# Patient Record
Sex: MALE | Race: WHITE | Employment: FULL TIME | URBAN - METROPOLITAN AREA
[De-identification: names, ages, dates, MRNs, and addresses within clinical notes are randomized per-mention and may not be internally consistent; named-entity substitution may affect disease eponyms.]

---

## 2021-05-20 ENCOUNTER — OFFICE VISIT (OUTPATIENT)
Dept: CARDIOLOGY CLINIC | Age: 66
End: 2021-05-20
Payer: MEDICARE

## 2021-05-20 ENCOUNTER — HOSPITAL ENCOUNTER (OUTPATIENT)
Dept: LAB | Age: 66
Discharge: HOME | End: 2021-05-20
Payer: MEDICARE

## 2021-05-20 VITALS
BODY MASS INDEX: 24.5 KG/M2 | OXYGEN SATURATION: 99 % | WEIGHT: 175 LBS | HEIGHT: 71 IN | HEART RATE: 47 BPM | SYSTOLIC BLOOD PRESSURE: 144 MMHG | DIASTOLIC BLOOD PRESSURE: 86 MMHG

## 2021-05-20 DIAGNOSIS — R00.2 PALPITATIONS: ICD-10-CM

## 2021-05-20 DIAGNOSIS — R06.02 SOB (SHORTNESS OF BREATH): ICD-10-CM

## 2021-05-20 DIAGNOSIS — R00.1 BRADYCARDIA: Primary | ICD-10-CM

## 2021-05-20 DIAGNOSIS — R00.1 BRADYCARDIA: ICD-10-CM

## 2021-05-20 DIAGNOSIS — I49.9 IRREGULAR HEART BEAT: ICD-10-CM

## 2021-05-20 DIAGNOSIS — R53.83 FATIGUE, UNSPECIFIED TYPE: ICD-10-CM

## 2021-05-20 DIAGNOSIS — Z86.79 HX OF VIRAL PERICARDITIS: ICD-10-CM

## 2021-05-20 DIAGNOSIS — E21.5 PARATHYROID DISORDER (HCC): ICD-10-CM

## 2021-05-20 LAB
ALBUMIN SERPL-MCNC: 3.8 G/DL (ref 3.4–5)
ALBUMIN/GLOB SERPL: 1.3 {RATIO} (ref 0.8–1.7)
ALP SERPL-CCNC: 41 U/L (ref 45–117)
ALT SERPL-CCNC: 23 U/L (ref 16–61)
ANION GAP SERPL CALC-SCNC: 5 MMOL/L (ref 3–18)
AST SERPL-CCNC: 18 U/L (ref 10–38)
BASOPHILS # BLD: 0 K/UL (ref 0–0.1)
BASOPHILS NFR BLD: 1 % (ref 0–2)
BILIRUB SERPL-MCNC: 0.8 MG/DL (ref 0.2–1)
BUN SERPL-MCNC: 16 MG/DL (ref 7–18)
BUN/CREAT SERPL: 23 (ref 12–20)
CALCIUM SERPL-MCNC: 8.5 MG/DL (ref 8.5–10.1)
CHLORIDE SERPL-SCNC: 110 MMOL/L (ref 100–111)
CO2 SERPL-SCNC: 29 MMOL/L (ref 21–32)
CREAT SERPL-MCNC: 0.71 MG/DL (ref 0.6–1.3)
DIFFERENTIAL METHOD BLD: ABNORMAL
EOSINOPHIL # BLD: 0.1 K/UL (ref 0–0.4)
EOSINOPHIL NFR BLD: 3 % (ref 0–5)
ERYTHROCYTE [DISTWIDTH] IN BLOOD BY AUTOMATED COUNT: 12.4 % (ref 11.6–14.5)
GLOBULIN SER CALC-MCNC: 3 G/DL (ref 2–4)
GLUCOSE SERPL-MCNC: 88 MG/DL (ref 74–99)
HCT VFR BLD AUTO: 40.1 % (ref 36–48)
HGB BLD-MCNC: 13.2 G/DL (ref 13–16)
LYMPHOCYTES # BLD: 1.4 K/UL (ref 0.9–3.6)
LYMPHOCYTES NFR BLD: 28 % (ref 21–52)
MAGNESIUM SERPL-MCNC: 2.1 MG/DL (ref 1.6–2.6)
MCH RBC QN AUTO: 29.7 PG (ref 24–34)
MCHC RBC AUTO-ENTMCNC: 32.9 G/DL (ref 31–37)
MCV RBC AUTO: 90.1 FL (ref 74–97)
MONOCYTES # BLD: 0.6 K/UL (ref 0.05–1.2)
MONOCYTES NFR BLD: 11 % (ref 3–10)
NEUTS SEG # BLD: 2.9 K/UL (ref 1.8–8)
NEUTS SEG NFR BLD: 57 % (ref 40–73)
PLATELET # BLD AUTO: 173 K/UL (ref 135–420)
PMV BLD AUTO: 12.3 FL (ref 9.2–11.8)
POTASSIUM SERPL-SCNC: 4.4 MMOL/L (ref 3.5–5.5)
PROT SERPL-MCNC: 6.8 G/DL (ref 6.4–8.2)
RBC # BLD AUTO: 4.45 M/UL (ref 4.35–5.65)
SODIUM SERPL-SCNC: 144 MMOL/L (ref 136–145)
T4 FREE SERPL-MCNC: 0.9 NG/DL (ref 0.7–1.5)
TSH SERPL DL<=0.05 MIU/L-ACNC: 2.81 UIU/ML (ref 0.36–3.74)
WBC # BLD AUTO: 5 K/UL (ref 4.6–13.2)

## 2021-05-20 PROCEDURE — 1101F PT FALLS ASSESS-DOCD LE1/YR: CPT | Performed by: INTERNAL MEDICINE

## 2021-05-20 PROCEDURE — 85025 COMPLETE CBC W/AUTO DIFF WBC: CPT

## 2021-05-20 PROCEDURE — G8510 SCR DEP NEG, NO PLAN REQD: HCPCS | Performed by: INTERNAL MEDICINE

## 2021-05-20 PROCEDURE — 93000 ELECTROCARDIOGRAM COMPLETE: CPT | Performed by: INTERNAL MEDICINE

## 2021-05-20 PROCEDURE — 80053 COMPREHEN METABOLIC PANEL: CPT

## 2021-05-20 PROCEDURE — 83735 ASSAY OF MAGNESIUM: CPT

## 2021-05-20 PROCEDURE — G8427 DOCREV CUR MEDS BY ELIG CLIN: HCPCS | Performed by: INTERNAL MEDICINE

## 2021-05-20 PROCEDURE — G8420 CALC BMI NORM PARAMETERS: HCPCS | Performed by: INTERNAL MEDICINE

## 2021-05-20 PROCEDURE — G8536 NO DOC ELDER MAL SCRN: HCPCS | Performed by: INTERNAL MEDICINE

## 2021-05-20 PROCEDURE — 99205 OFFICE O/P NEW HI 60 MIN: CPT | Performed by: INTERNAL MEDICINE

## 2021-05-20 PROCEDURE — 3017F COLORECTAL CA SCREEN DOC REV: CPT | Performed by: INTERNAL MEDICINE

## 2021-05-20 PROCEDURE — 36415 COLL VENOUS BLD VENIPUNCTURE: CPT

## 2021-05-20 PROCEDURE — 84439 ASSAY OF FREE THYROXINE: CPT

## 2021-05-20 RX ORDER — MELOXICAM 15 MG/1
15 TABLET ORAL DAILY
COMMUNITY
Start: 2021-03-22 | End: 2022-07-18 | Stop reason: ALTCHOICE

## 2021-05-20 RX ORDER — SERTRALINE HYDROCHLORIDE 100 MG/1
100 TABLET, FILM COATED ORAL DAILY
COMMUNITY
Start: 2021-03-18

## 2021-05-20 RX ORDER — TAMSULOSIN HYDROCHLORIDE 0.4 MG/1
0.4 CAPSULE ORAL DAILY
COMMUNITY

## 2021-05-20 RX ORDER — LISINOPRIL 10 MG/1
10 TABLET ORAL DAILY
COMMUNITY
Start: 2021-03-18

## 2021-05-20 RX ORDER — OMEPRAZOLE 20 MG/1
20 CAPSULE, DELAYED RELEASE ORAL DAILY
COMMUNITY
Start: 2021-03-18

## 2021-05-20 NOTE — PROGRESS NOTES
Lu Negrete presents today for   Chief Complaint   Patient presents with    New Patient     Self Ref AFib / irregluar hrt beat and rate       Lu Negrete preferred language for health care discussion is english/other. Is someone accompanying this pt? yes    Is the patient using any DME equipment during [address]? no    Depression Screening:  3 most recent PHQ Screens 5/20/2021   Little interest or pleasure in doing things Not at all   Feeling down, depressed, irritable, or hopeless Not at all   Total Score PHQ 2 0       Learning Assessment:  Learning Assessment 5/20/2021   PRIMARY LEARNER Patient   PRIMARY LANGUAGE ENGLISH   LEARNER PREFERENCE PRIMARY DEMONSTRATION   ANSWERED BY patient   RELATIONSHIP SELF       Abuse Screening:  Abuse Screening Questionnaire 5/20/2021   Do you ever feel afraid of your partner? N   Are you in a relationship with someone who physically or mentally threatens you? N   Is it safe for you to go home? Y       Fall Risk  Fall Risk Assessment, last 12 mths 5/20/2021   Able to walk? Yes   Fall in past 12 months? 0   Do you feel unsteady? 0   Are you worried about falling 0           Pt currently taking Anticoagulant therapy? no    Pt currently taking Antiplatelet therapy ? no      Coordination of Care:  1. Have you been to the ER, urgent care clinic since your last visit? Hospitalized since your last visit? no    2. Have you seen or consulted any other health care providers outside of the [address] [place] [address] since your last visit? Include any pap smears or colon screening.  no

## 2021-05-20 NOTE — PROGRESS NOTES
History of Present Illness:  77year old male, self referred for low heart rate and irregular heartbeat. He had a physical a couple months ago and was told everything was okay. 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 He went inside to check his blood pressure and systolic was 110Z, but heart rate was about 45-50 bpm.  His wife checked his heart rate as well. He was noted to have some irregularity. Notes minimal palpitations. No significant dyspnea or chest pain, PND, orthopnea or edema. He had remote pericarditis many years ago, thought to be potentially due to either a virus or chemical exposure. He has never had any other history of heart disease. No diabetes. He does have a history of high blood pressure. No dyslipidemia. No tobacco or alcohol use. He is quite active on the farm. Impression:  1. Recent findings of bradycardia, heart rate high 40s, with possible associated symptoms of fatigue, no syncope. 2. History of hypertension, on ACE inhibitor. 3. GERD, on PPI. 4. History of parathyroidectomy many years ago. 5. History of pericarditis in remote past, unclear etiology. 6. Recent increase in stressors with some change in farm equipment. 7. Planned trip to [facility], [facility] over [employer]. Plan:  I had a lengthy discussion. At this point I do not have a good etiology, but clearly he is at risk for symptomatic worsening bradycardia given his baseline. He is, however, quite active and otherwise healthy on the farm, so it is unclear if this is just his natural baseline. I am going to obtain an event monitor for a few weeks, as well as complete blood work, including calcium and TSH. I am also going to obtain an echocardiogram given his previous pericarditis. I will see him back after testing. All questions answered.       Past Medical History:   Diagnosis Date    Anxiety     Arthritis     Enlarged prostate     Essential hypertension        Current Outpatient Medications   Medication Sig Dispense Refill    sertraline (ZOLOFT) 100 mg tablet Take 100 mg by mouth daily.  tamsulosin (FLOMAX) 0.4 mg capsule Take 0.4 mg by mouth daily.  omeprazole (PRILOSEC) 20 mg capsule Take 20 mg by mouth daily.  lisinopriL (PRINIVIL, ZESTRIL) 10 mg tablet Take 10 mg by mouth daily.  meloxicam (MOBIC) 15 mg tablet Take 15 mg by mouth daily. Social History   reports that he has quit smoking. He has quit using smokeless tobacco.   reports current alcohol use of about 1.0 standard drinks of alcohol per week. Family History  family history includes Diabetes in his father; Heart Surgery in his father. Review of Systems  Except as stated above include:  Constitutional: Negative for fever, chills and malaise/fatigue. HEENT: No congestion or recent URI. Gastrointestinal: No nausea, vomiting, abdominal pain, bloody stools. Pulmonary:  Negative except as stated above. Cardiac:  Negative except as stated above. Musculoskeletal: Negative except as stated above. Neurological:  No localized symptoms. Skin:  Negative except as stated above. Psych:  Negative except as stated above. Endocrine:  Negative except as stated above. PHYSICAL EXAM  BP Readings from Last 3 Encounters:   05/20/21 (!) 144/86     Pulse Readings from Last 3 Encounters:   05/20/21 (!) 47     Wt Readings from Last 3 Encounters:   05/20/21 79.4 kg (175 lb)     General:   Well developed, well groomed. Head/Neck:   No obvious jugular venous distention     No obvious carotid pulsations. No evidence of xanthelasma. Lungs:   No respiratory distress. Clear bilaterally. Heart:  Debora Juana, regular. Normal S1/S2. Palpation grossly normal.    No significant murmurs, rubs or gallops. Abdomen:   Non-acute abdomen. No obvious pulsations. Extremities:   Intact peripheral pulses. No significant edema. Neurological:   Alert and oriented to person, place, time. No focal neurological deficit visually. Skin:   No obvious rash    Blood Pressure Metric:  Monitor recommended and adjustments stated if needed.

## 2021-06-24 LAB
ECHO AO ROOT DIAM: 3.5 CM
ECHO LA AREA 4C: 18.81 CM2
ECHO LA VOL 2C: 77.99 ML (ref 18–58)
ECHO LA VOL 4C: 50.92 ML (ref 18–58)
ECHO LA VOL BP: 72.33 ML (ref 18–58)
ECHO LA VOL/BSA BIPLANE: 36.35 ML/M2 (ref 16–28)
ECHO LA VOLUME INDEX A2C: 39.19 ML/M2 (ref 16–28)
ECHO LA VOLUME INDEX A4C: 25.59 ML/M2 (ref 16–28)
ECHO LV E' LATERAL VELOCITY: 8.18 CM/S
ECHO LV E' SEPTAL VELOCITY: 9.47 CM/S
ECHO LV INTERNAL DIMENSION DIASTOLIC: 4.84 CM (ref 4.2–5.9)
ECHO LV INTERNAL DIMENSION SYSTOLIC: 3.47 CM
ECHO LV IVSD: 1.01 CM (ref 0.6–1)
ECHO LV MASS 2D: 167.9 G (ref 88–224)
ECHO LV MASS INDEX 2D: 84.4 G/M2 (ref 49–115)
ECHO LV POSTERIOR WALL DIASTOLIC: 0.95 CM (ref 0.6–1)
ECHO LVOT DIAM: 2 CM
ECHO LVOT PEAK GRADIENT: 3.27 MMHG
ECHO LVOT PEAK VELOCITY: 90.39 CM/S
ECHO LVOT SV: 67.2 ML
ECHO LVOT VTI: 21.3 CM
ECHO MV A VELOCITY: 50.97 CM/S
ECHO MV E DECELERATION TIME (DT): 230.88 MS
ECHO MV E VELOCITY: 75.1 CM/S
ECHO MV E/A RATIO: 1.47
ECHO MV E/E' LATERAL: 9.18
ECHO MV E/E' RATIO (AVERAGED): 8.56
ECHO MV E/E' SEPTAL: 7.93
ECHO PVEIN A DURATION: 108.47 MS
ECHO PVEIN A VELOCITY: 18.94 CM/S
LVOT MG: 1.92 MMHG

## 2021-06-30 ENCOUNTER — OFFICE VISIT (OUTPATIENT)
Dept: CARDIOLOGY CLINIC | Age: 66
End: 2021-06-30
Payer: MEDICARE

## 2021-06-30 VITALS
HEART RATE: 64 BPM | OXYGEN SATURATION: 98 % | SYSTOLIC BLOOD PRESSURE: 114 MMHG | HEIGHT: 71 IN | BODY MASS INDEX: 24.08 KG/M2 | DIASTOLIC BLOOD PRESSURE: 60 MMHG | WEIGHT: 172 LBS

## 2021-06-30 DIAGNOSIS — R00.1 BRADYCARDIA: ICD-10-CM

## 2021-06-30 DIAGNOSIS — I49.5 SICK SINUS SYNDROME (HCC): Primary | ICD-10-CM

## 2021-06-30 DIAGNOSIS — R00.2 PALPITATIONS: ICD-10-CM

## 2021-06-30 DIAGNOSIS — R53.83 FATIGUE, UNSPECIFIED TYPE: ICD-10-CM

## 2021-06-30 DIAGNOSIS — R06.02 SOB (SHORTNESS OF BREATH): ICD-10-CM

## 2021-06-30 DIAGNOSIS — I49.9 IRREGULAR HEART BEAT: ICD-10-CM

## 2021-06-30 PROCEDURE — 3017F COLORECTAL CA SCREEN DOC REV: CPT | Performed by: INTERNAL MEDICINE

## 2021-06-30 PROCEDURE — G8536 NO DOC ELDER MAL SCRN: HCPCS | Performed by: INTERNAL MEDICINE

## 2021-06-30 PROCEDURE — G8510 SCR DEP NEG, NO PLAN REQD: HCPCS | Performed by: INTERNAL MEDICINE

## 2021-06-30 PROCEDURE — 1101F PT FALLS ASSESS-DOCD LE1/YR: CPT | Performed by: INTERNAL MEDICINE

## 2021-06-30 PROCEDURE — G8427 DOCREV CUR MEDS BY ELIG CLIN: HCPCS | Performed by: INTERNAL MEDICINE

## 2021-06-30 PROCEDURE — 99214 OFFICE O/P EST MOD 30 MIN: CPT | Performed by: INTERNAL MEDICINE

## 2021-06-30 PROCEDURE — G8420 CALC BMI NORM PARAMETERS: HCPCS | Performed by: INTERNAL MEDICINE

## 2021-06-30 NOTE — PROGRESS NOTES
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 He did notice that his energy was decreased and he had some rare palpitations. No chest pain, syncope. He states he has a hard time getting going in the morning, but then once he gets going he actually feels well. He had an echocardiogram, event monitor and blood work. Here to discuss options, accompanied by family. Impression:  1. Recent findings of bradycardia with heart rate down to 40s, although event monitor showed heart rate between 40s up to 120s with slightly blunted response consistent with mild sick sinus syndrome, but no prolonged pauses. 2. History of hypertension, on ACE inhibitor. 3. Echo June, 2021 with normal function, mild left atrial enlargement. 4. GERD, on PPI. 5. History of remote parathyroidectomy. 6. History of pericarditis in the remote past.    Plan:  I had a discussion about his mild sick sinus syndrome without prolonged pauses. He seems to do better after he starts working, which usually is the opposite for significant sick sinus syndrome to cause symptoms. It may be contributing partially and I recommended we continue to monitor. I will plan to repeat an echocardiogram in 2-3 years due to some mild aortic root dilatation and mild left atrial enlargement, and I will see him back ni a year with an EKG unless symptoms change. Past Medical History:   Diagnosis Date    Anxiety     Arthritis     Enlarged prostate     Essential hypertension        Current Outpatient Medications   Medication Sig Dispense Refill    sertraline (ZOLOFT) 100 mg tablet Take 100 mg by mouth daily.  tamsulosin (FLOMAX) 0.4 mg capsule Take 0.4 mg by mouth daily.  omeprazole (PRILOSEC) 20 mg capsule Take 20 mg by mouth daily.  lisinopriL (PRINIVIL, ZESTRIL) 10 mg tablet Take 10 mg by mouth daily.  meloxicam (MOBIC) 15 mg tablet Take 15 mg by mouth daily.          Social History   reports that he has quit smoking. He has quit using smokeless tobacco.   reports current alcohol use of about 1.0 standard drinks of alcohol per week. Family History  family history includes Diabetes in his father; Heart Surgery in his father. Review of Systems  Except as stated above include:  Constitutional: Negative for fever, chills and malaise/fatigue. HEENT: No congestion or recent URI. Gastrointestinal: No nausea, vomiting, abdominal pain, bloody stools. Pulmonary:  Negative except as stated above. Cardiac:  Negative except as stated above. Musculoskeletal: Negative except as stated above. Neurological:  No localized symptoms. Skin:  Negative except as stated above. Psych:  Negative except as stated above. Endocrine:  Negative except as stated above. PHYSICAL EXAM  BP Readings from Last 3 Encounters:   06/24/21 (!) 144/86   05/20/21 (!) 144/86     Pulse Readings from Last 3 Encounters:   05/20/21 (!) 47     Wt Readings from Last 3 Encounters:   06/24/21 79.4 kg (175 lb)   05/20/21 79.4 kg (175 lb)     General:   Well developed, well groomed. Head/Neck:   No obvious jugular venous distention     No obvious carotid pulsations. No evidence of xanthelasma. Lungs:   No respiratory distress. Clear bilaterally. Heart:  Regular rate and rhythm. Normal S1/S2. Palpation grossly normal.    No significant murmurs, rubs or gallops. Abdomen:   Non-acute abdomen. No obvious pulsations. Extremities:   Intact peripheral pulses. No significant edema. Neurological:   Alert and oriented to person, place, time. No focal neurological deficit visually. Skin:   No obvious rash    Blood Pressure Metric:  Monitor recommended and adjustments stated if needed.

## 2021-06-30 NOTE — PROGRESS NOTES
Dangelo Brandt presents today for   Chief Complaint   Patient presents with    Follow-up     5 week follow up        Dangelo Brandt preferred language for health care discussion is english/other. Is someone accompanying this pt? no    Is the patient using any DME equipment during [address]? no    Depression Screening:  3 most recent PHQ Screens 6/30/2021   Little interest or pleasure in doing things Not at all   Feeling down, depressed, irritable, or hopeless Not at all   Total Score PHQ 2 0       Learning Assessment:  Learning Assessment 5/20/2021   PRIMARY LEARNER Patient   PRIMARY LANGUAGE ENGLISH   LEARNER PREFERENCE PRIMARY DEMONSTRATION   ANSWERED BY patient   RELATIONSHIP SELF       Abuse Screening:  Abuse Screening Questionnaire 5/20/2021   Do you ever feel afraid of your partner? N   Are you in a relationship with someone who physically or mentally threatens you? N   Is it safe for you to go home? Y       Fall Risk  Fall Risk Assessment, last 12 mths 6/30/2021   Able to walk? Yes   Fall in past 12 months? 0   Do you feel unsteady? 0   Are you worried about falling 0       Pt currently taking Anticoagulant therapy? no    Coordination of Care:  1. Have you been to the ER, urgent care clinic since your last visit? Hospitalized since your last visit? no    2. Have you seen or consulted any other health care providers outside of the [address] [place] [address] since your last visit? Include any pap smears or colon screening.  no

## 2022-06-29 ENCOUNTER — OFFICE VISIT (OUTPATIENT)
Dept: CARDIOLOGY CLINIC | Age: 67
End: 2022-06-29
Payer: MEDICARE

## 2022-06-29 VITALS
SYSTOLIC BLOOD PRESSURE: 118 MMHG | HEIGHT: 71 IN | DIASTOLIC BLOOD PRESSURE: 62 MMHG | BODY MASS INDEX: 24.08 KG/M2 | HEART RATE: 43 BPM | OXYGEN SATURATION: 97 % | WEIGHT: 172 LBS

## 2022-06-29 DIAGNOSIS — R06.02 SOB (SHORTNESS OF BREATH): ICD-10-CM

## 2022-06-29 DIAGNOSIS — R00.1 BRADYCARDIA: Primary | ICD-10-CM

## 2022-06-29 DIAGNOSIS — R00.2 PALPITATIONS: ICD-10-CM

## 2022-06-29 DIAGNOSIS — R53.83 FATIGUE, UNSPECIFIED TYPE: ICD-10-CM

## 2022-06-29 DIAGNOSIS — K21.9 GASTROESOPHAGEAL REFLUX DISEASE, UNSPECIFIED WHETHER ESOPHAGITIS PRESENT: ICD-10-CM

## 2022-06-29 DIAGNOSIS — I49.9 IRREGULAR HEART BEAT: ICD-10-CM

## 2022-06-29 DIAGNOSIS — I10 PRIMARY HYPERTENSION: ICD-10-CM

## 2022-06-29 DIAGNOSIS — M19.91 PRIMARY OSTEOARTHRITIS, UNSPECIFIED SITE: ICD-10-CM

## 2022-06-29 PROCEDURE — G8432 DEP SCR NOT DOC, RNG: HCPCS | Performed by: INTERNAL MEDICINE

## 2022-06-29 PROCEDURE — G8752 SYS BP LESS 140: HCPCS | Performed by: INTERNAL MEDICINE

## 2022-06-29 PROCEDURE — G8427 DOCREV CUR MEDS BY ELIG CLIN: HCPCS | Performed by: INTERNAL MEDICINE

## 2022-06-29 PROCEDURE — G8754 DIAS BP LESS 90: HCPCS | Performed by: INTERNAL MEDICINE

## 2022-06-29 PROCEDURE — G8536 NO DOC ELDER MAL SCRN: HCPCS | Performed by: INTERNAL MEDICINE

## 2022-06-29 PROCEDURE — 93000 ELECTROCARDIOGRAM COMPLETE: CPT | Performed by: INTERNAL MEDICINE

## 2022-06-29 PROCEDURE — 3017F COLORECTAL CA SCREEN DOC REV: CPT | Performed by: INTERNAL MEDICINE

## 2022-06-29 PROCEDURE — 1123F ACP DISCUSS/DSCN MKR DOCD: CPT | Performed by: INTERNAL MEDICINE

## 2022-06-29 PROCEDURE — 1101F PT FALLS ASSESS-DOCD LE1/YR: CPT | Performed by: INTERNAL MEDICINE

## 2022-06-29 PROCEDURE — 99215 OFFICE O/P EST HI 40 MIN: CPT | Performed by: INTERNAL MEDICINE

## 2022-06-29 PROCEDURE — G8420 CALC BMI NORM PARAMETERS: HCPCS | Performed by: INTERNAL MEDICINE

## 2022-06-29 NOTE — PROGRESS NOTES
Jacob Orantes presents today for   Chief Complaint   Patient presents with    Follow-up     1 year - no cardiac complaints        Jacob Orantes preferred language for health care discussion is english/other. Is someone accompanying this pt? no    Is the patient using any DME equipment during [address]? no    Depression Screening:  3 most recent PHQ Screens 6/30/2021   Little interest or pleasure in doing things Not at all   Feeling down, depressed, irritable, or hopeless Not at all   Total Score PHQ 2 0       Learning Assessment:  Learning Assessment 5/20/2021   PRIMARY LEARNER Patient   PRIMARY LANGUAGE ENGLISH   LEARNER PREFERENCE PRIMARY DEMONSTRATION   ANSWERED BY patient   RELATIONSHIP SELF       Abuse Screening:  Abuse Screening Questionnaire 5/20/2021   Do you ever feel afraid of your partner? N   Are you in a relationship with someone who physically or mentally threatens you? N   Is it safe for you to go home? Y       Fall Risk  Fall Risk Assessment, last 12 mths 6/30/2021   Able to walk? Yes   Fall in past 12 months? 0   Do you feel unsteady? 0   Are you worried about falling 0       Pt currently taking Anticoagulant therapy? no    Coordination of Care:  1. Have you been to the ER, urgent care clinic since your last visit? Hospitalized since your last visit? no    2. Have you seen or consulted any other health care providers outside of the [address] [place] [address] since your last visit? Include any pap smears or colon screening.  no

## 2022-06-29 NOTE — PROGRESS NOTES
History of Present Illness:  74 YOM here for follow up. Last time I saw him was about a year ago. He was initially referred for bradycardia. He had an event monitor showing heart rate in the 40s going up to 120s since it was sick sinus syndrome and mild chronotropic intolerance. 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 No new chest pain, dyspnea, presyncope, syncope, PND, orthopnea or edema. He does report some mild fatigue at times, which he relates to aging and his activity. He is contemplating possible knee surgery in the coming year. Impression:  1. Sick sinus syndrome with chronotropic intolerance with event monitor May 2021, heart rate down to 40s, but no prolonged pauses and mildly blunted heart rate response to 120s. No new symptoms, including no presyncope or syncope, no profound fatigue. 2. Hypertension, controlled on ACE inhibitor. 3. Echo June 2021 with normal function, but mild aortic root dilatation. 4. GERD, on PPI. 5. Remote parathyroidectomy. 6. History of remote pericarditis. 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 If he proceeds to knee surgery, he is at increased risk for anesthesia reactions and bradycardia and so I would plan to obtain event monitor before clearing. If he does not get surgery in the next year, I will plan to repeat a Holter next year, as well as an echocardiogram given his mildly dilated aortic root. 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 All questions answered.        Past Medical History:   Diagnosis Date    Anxiety     Arthritis     Enlarged prostate     Essential hypertension        Current Outpatient Medications   Medication Sig Dispense Refill    sertraline (ZOLOFT) 100 mg tablet Take 100 mg by mouth daily.  tamsulosin (FLOMAX) 0.4 mg capsule Take 0.4 mg by mouth daily.  omeprazole (PRILOSEC) 20 mg capsule Take 20 mg by mouth daily.  lisinopriL (PRINIVIL, ZESTRIL) 10 mg tablet Take 10 mg by mouth daily.  meloxicam (MOBIC) 15 mg tablet Take 15 mg by mouth daily. Social History   reports that he has quit smoking. He has quit using smokeless tobacco.   reports current alcohol use of about 1.0 standard drink of alcohol per week. Family History  family history includes Diabetes in his father; Heart Surgery in his father. Review of Systems  Except as stated above include:  Constitutional: Negative for fever, chills and malaise/fatigue. HEENT: No congestion or recent URI. Gastrointestinal: No nausea, vomiting, abdominal pain, bloody stools. Pulmonary:  Negative except as stated above. Cardiac:  Negative except as stated above. Musculoskeletal: Negative except as stated above. Neurological:  No localized symptoms. Skin:  Negative except as stated above. Psych:  Negative except as stated above. Endocrine:  Negative except as stated above. PHYSICAL EXAM  BP Readings from Last 3 Encounters:   06/29/22 118/62   06/30/21 114/60   06/24/21 (!) 144/86     Pulse Readings from Last 3 Encounters:   06/29/22 (!) 43   06/30/21 64   05/20/21 (!) 47     Wt Readings from Last 3 Encounters:   06/29/22 78 kg (172 lb)   06/30/21 78 kg (172 lb)   06/24/21 79.4 kg (175 lb)     General:   Well developed, well groomed. Head/Neck:   No obvious jugular venous distention     No obvious carotid pulsations. No evidence of xanthelasma. Lungs:   No respiratory distress. Clear bilaterally. Heart:  Sherol Nap, regular. Normal S1/S2. Palpation grossly normal.    No significant murmurs, rubs or gallops. Abdomen:   Non-acute abdomen. No obvious pulsations. Extremities:   Intact peripheral pulses. No significant edema.     Neurological: Alert and oriented to person, place, time. No focal neurological deficit visually. Skin:   No obvious rash    Blood Pressure Metric:  Monitor recommended and adjustments stated if needed.

## 2022-06-30 ENCOUNTER — TELEPHONE (OUTPATIENT)
Dept: CARDIOLOGY CLINIC | Age: 67
End: 2022-06-30

## 2022-06-30 DIAGNOSIS — R00.1 BRADYCARDIA: Primary | ICD-10-CM

## 2022-06-30 DIAGNOSIS — I49.9 IRREGULAR HEART BEAT: ICD-10-CM

## 2022-06-30 NOTE — TELEPHONE ENCOUNTER
Wife called because she  Pancho Hoskins that although pt in office yesterday and saw Dr Cassandra Ng, she doesn't feel like pt was completely honest with Dr Cassandra Ng about how he is feeling. Wife says pt is irritable and she feels this may be related to \"him not feeling well\". 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 She said that pt was aware that she was going to call Dr Ike Mas office this am to discuss. Told wife would update Dr Cassandra Ng and call them back. Verbal order and read back per Luis Carlos Rios MD  Order event monitor for bradycardia x 1 week    Called pt back left voicemail on home phone, and spoke to wife on her phone, and told her to look for monitor to come in the mail, pt to wear x 1 week, and then if anything needs follow up after we get results, we will call them back, and they can both come to appt together.

## 2022-07-12 ENCOUNTER — TELEPHONE (OUTPATIENT)
Dept: CARDIOLOGY CLINIC | Age: 67
End: 2022-07-12

## 2022-07-12 NOTE — TELEPHONE ENCOUNTER
Received a call from [employer] that pt had had 14 beats NSVT rate 197, followed by some SVT. They called pt, and he told them that he was walking outside at the time and had some dizziness. Called pt back, reached his wife on home phone. Informed her why nurse was calling and she said that he had told her about the call from [employer]. 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 Told her would speak to cardiologist in office as Dr Argentina Garcia on vacation this week, and call her  back with their suggestions. Reviewed [employer] report/printout/notes with Dr Roz Kocher. Verbal order and read back per Andre Wynn MD    Make sure pt has follow up appt with Dr Argentina Garcia after pt is done wearing event monitor. Called pt, reviewed with him what the report had said about his heart rhythm at the time that he felt dizzy, and that Dr Roz Kocher wanted nurse to call him and make him an appt with Dr Argentina Garcia after pt has mailed in event monitor. Told him will make appt for July 27, but could end up calling him back to make a sooner appt if the results are back sooner and Dr Roz Kocher had said that he probably needed to stop doing all that \"scouting cotton\" in this heat and rest more. Pt agreed.

## 2022-07-18 ENCOUNTER — OFFICE VISIT (OUTPATIENT)
Dept: CARDIOLOGY CLINIC | Age: 67
End: 2022-07-18
Payer: MEDICARE

## 2022-07-18 VITALS
HEART RATE: 48 BPM | HEIGHT: 71 IN | DIASTOLIC BLOOD PRESSURE: 72 MMHG | WEIGHT: 171.6 LBS | OXYGEN SATURATION: 98 % | SYSTOLIC BLOOD PRESSURE: 110 MMHG | BODY MASS INDEX: 24.02 KG/M2

## 2022-07-18 DIAGNOSIS — R00.2 PALPITATIONS: ICD-10-CM

## 2022-07-18 DIAGNOSIS — R00.1 BRADYCARDIA: Primary | ICD-10-CM

## 2022-07-18 DIAGNOSIS — I10 PRIMARY HYPERTENSION: ICD-10-CM

## 2022-07-18 DIAGNOSIS — I49.5 SICK SINUS SYNDROME (HCC): ICD-10-CM

## 2022-07-18 PROCEDURE — 99214 OFFICE O/P EST MOD 30 MIN: CPT | Performed by: NURSE PRACTITIONER

## 2022-07-18 PROCEDURE — 1123F ACP DISCUSS/DSCN MKR DOCD: CPT | Performed by: NURSE PRACTITIONER

## 2022-07-18 PROCEDURE — G8432 DEP SCR NOT DOC, RNG: HCPCS | Performed by: NURSE PRACTITIONER

## 2022-07-18 PROCEDURE — G8752 SYS BP LESS 140: HCPCS | Performed by: NURSE PRACTITIONER

## 2022-07-18 PROCEDURE — G8536 NO DOC ELDER MAL SCRN: HCPCS | Performed by: NURSE PRACTITIONER

## 2022-07-18 PROCEDURE — 3017F COLORECTAL CA SCREEN DOC REV: CPT | Performed by: NURSE PRACTITIONER

## 2022-07-18 PROCEDURE — G8420 CALC BMI NORM PARAMETERS: HCPCS | Performed by: NURSE PRACTITIONER

## 2022-07-18 PROCEDURE — 1101F PT FALLS ASSESS-DOCD LE1/YR: CPT | Performed by: NURSE PRACTITIONER

## 2022-07-18 PROCEDURE — G8754 DIAS BP LESS 90: HCPCS | Performed by: NURSE PRACTITIONER

## 2022-07-18 PROCEDURE — G8427 DOCREV CUR MEDS BY ELIG CLIN: HCPCS | Performed by: NURSE PRACTITIONER

## 2022-07-18 RX ORDER — METOPROLOL SUCCINATE 25 MG/1
25 TABLET, EXTENDED RELEASE ORAL DAILY
Qty: 30 TABLET | Refills: 3 | Status: SHIPPED | OUTPATIENT
Start: 2022-07-18 | End: 2022-09-08

## 2022-07-18 NOTE — PROGRESS NOTES
German Benitez presents today for   Chief Complaint   Patient presents with    Follow-up     event monitor follow up        Is someone accompanying this pt? Renetta Ras     Is the patient using any DME equipment during OV? no    Depression Screening:  3 most recent PHQ Screens 7/18/2022   Little interest or pleasure in doing things Not at all   Feeling down, depressed, irritable, or hopeless Not at all   Total Score PHQ 2 0       Learning Assessment:  Learning Assessment 5/20/2021   PRIMARY LEARNER Patient   PRIMARY LANGUAGE ENGLISH   LEARNER PREFERENCE PRIMARY DEMONSTRATION   ANSWERED BY patient   RELATIONSHIP SELF       Abuse Screening:  Abuse Screening Questionnaire 5/20/2021   Do you ever feel afraid of your partner? N   Are you in a relationship with someone who physically or mentally threatens you? N   Is it safe for you to go home? Y       Fall Risk  Fall Risk Assessment, last 12 mths 6/30/2021   Able to walk? Yes   Fall in past 12 months? 0   Do you feel unsteady? 0   Are you worried about falling 0       Coordination of Care:  1. Have you been to the ER, urgent care clinic since your last visit? no  Hospitalized since your last visit? no    2. Have you seen or consulted any other health care providers outside of the [address] [place] [address] since your last visit? no Include any pap smears or colon screening.  no

## 2022-07-18 NOTE — PROGRESS NOTES
Li Garber presents today for evaluation after event monitor showed 14 beats of non-sustained VT as well as a run of PSVT. His event monitor also showed sinus bradycardia as low as 52 bpm as well as occasional PVCs. He states that he is unaware of the ectopics and did not notice the the non-sustained VT. He states that he was not quite forthright with Dr. Pillo Hassan during his 6/29/22 appointment as he did not mention the dizziness, increasing fatigue, and brief episodes of shortness of breath (especially when it is very humid) that he has been experiencing. His wife did not accompany him during the last visit and she called the office to report some of his symptoms. She is with him today and she states that he has been taking a nap during the day which is unusual for him and it is taking him longer to complete his daily routine. He states that he \"does not feel quite right lately. \"    He is a 74year old male with history of sinus bradycardia with chronotropic incompetence (event monitor 2021), hypertension, GERD, and remote parathyroidectomy. He was seen by Dr. Pillo Hassan on 6/29/22. He wore an event monitor in 2021 which showed heart rate down into the 40's but no prolonged pauses. He had an echo done in June 2021 which showed an EF of 60-65%, normal wall motion, and dilated aortic root at 3.5cm. Denies chest pain, tightness, heaviness, and palpitations. Denies shortness of breath at rest, dyspnea on exertion, orthopnea and PND. Admits to occasional shortness of breath when it is very humid. Denies abdominal bloating. Denies lightheadedness, admits to occasional dizziness, and denies yncope. Denies lower extremity edema and claudication. Denies nausea, vomiting, diarrhea, melena, hematochezia. Denies hematuria, urgency, frequency. Denies fever, chills.         PMH:  Past Medical History:   Diagnosis Date    Anxiety     Arthritis     Enlarged prostate     Essential hypertension PSH:  Past Surgical History:   Procedure Laterality Date    HX APPENDECTOMY      HX PARTIAL THYROIDECTOMY  2016    HX SKIN BIOPSY         MEDS:  Current Outpatient Medications   Medication Sig    sertraline (ZOLOFT) 100 mg tablet Take 100 mg by mouth daily.  tamsulosin (FLOMAX) 0.4 mg capsule Take 0.4 mg by mouth daily.  omeprazole (PRILOSEC) 20 mg capsule Take 20 mg by mouth daily.  lisinopriL (PRINIVIL, ZESTRIL) 10 mg tablet Take 10 mg by mouth daily. No current facility-administered medications for this visit. Allergies and Sensitivities:  No Known Allergies    Family History:  Family History   Problem Relation Age of Onset    Heart Surgery Father     Diabetes Father        Social History:  He  reports that he has quit smoking. He has quit using smokeless tobacco.  He  reports current alcohol use of about 1.0 standard drink of alcohol per week. Physical:  Visit Vitals  /72 (BP 1 Location: Left arm, BP Patient Position: Sitting)   Pulse (!) 48   Ht 5' 11\" (1.803 m)   Wt 77.8 kg (171 lb 9.6 oz)   SpO2 98%   BMI 23.93 kg/m²       Exam:  Neck:  Supple, no JVD, no carotid bruits  CV:  Normal S1 and  S2, no murmurs, rubs, or gallops noted  Lungs:  Clear to ausculation throughout, no wheezes or rales  Abd:  Soft, non-tender, non-distended with good bowel sounds.   No hepatosplenomegaly  Extremities:  No edema      Data:  EKG:  Sinus bradycardia, heart rate 48 bpm.      LABS:  Lab Results   Component Value Date/Time    Sodium 144 05/20/2021 09:50 AM    Potassium 4.4 05/20/2021 09:50 AM    Chloride 110 05/20/2021 09:50 AM    CO2 29 05/20/2021 09:50 AM    Glucose 88 05/20/2021 09:50 AM    BUN 16 05/20/2021 09:50 AM    Creatinine 0.71 05/20/2021 09:50 AM     No results found for: CHOL, CHOLX, CHLST, CHOLV, HDL, HDLP, LDL, LDLC, DLDLP, TGLX, TRIGL, TRIGP, CHHD, CHHDX  Lab Results   Component Value Date/Time    ALT (SGPT) 23 05/20/2021 09:50 AM         Impression/Plan:  1.  14 beat run of non-sustained VT on event monitor, patient was unaware that it was occurring  2. Sinus bradycardia with chronotropic incompetence  3. Hypertension, blood pressure controlled  4. GERD  5. Fatigue  6. Dizziness    Mr. Coleen Salgado was seen today for follow-up after his event monitor showed a 14 beat run of non-sustained VT. He states that he was unaware that it was occurring. He has noticed episodes of dizziness over the past couple of months. Today, he reports feeling more fatigued and it is taking him longer to complete his daily tasks. His wife states that he has been taking a nap during the day which is unusual for him. He states that he \"does not feel quite right lately. \"    Event monitor results discussed with him and his wife. Event monitor transmissions reviewed with them. Medication option of beta blocker discussed to prevent fast heart rates but there is concern for slow heart rates as well. His EKG today showed sinus bradycardia @ 48 bpm.  They state that Dr. Roxane Wagner has briefly discussed possible pacemaker with them in the past.  This was discussed further today and their questions were answered. Both Mr. Coleen Salgado and his wife are agreeable to pacemaker if the decision is made to proceed with this. I discussed his case with Dr. Roxane Wagner and he recommends starting a low dose beta blocker and scheduling patient to follow-up with him either this week or next week. MD Elia Temple NP  Let's start him on a beta-blocker, then I can see him this week or next. For patients like this, always ok to overbook for me to see. Thx     I called Mr. Coleen Salgado (he was not available) and spoke with his wife and made her aware of the plan to start a beta blocker and follow-up appointment scheduled to see Dr Roxane Wagner next week. I asked that she check his blood pressure and heart rate twice a day.   The first being about 3 hours after taking the medication and then again sometime in the evening. I asked that they keep a diary of the readings and bring it with them when they return to the office next week. If his heart rate is consistently below 50 bpm and/or he complains of fatigue, lightheadedness, more dizziness, or if he has a syncopal episode, I asked that they call the office and let us know. Prescription for Toprol XL 25mg once a day e-scribed to his pharmacy. Time:  28 minutes      Haylie Woodard MSN, FNP-BC      Please note:  Portions of this chart were created with Dragon medical speech to text program.  Unrecognized errors may be present.

## 2022-07-18 NOTE — PATIENT INSTRUCTIONS
Continue present medication regimen  Will contact you if the decision is made to proceed with pacemaker  Follow-up with Dr. Lima Wagner as scheduled and as needed  **Addendum:  Begin metoprolol succinate 25mg once a day per Dr. Lima Wagner

## 2022-07-26 ENCOUNTER — TELEPHONE (OUTPATIENT)
Dept: CARDIOLOGY CLINIC | Age: 67
End: 2022-07-26

## 2022-07-26 DIAGNOSIS — I49.9 IRREGULAR HEART BEAT: ICD-10-CM

## 2022-07-26 DIAGNOSIS — R00.1 BRADYCARDIA: ICD-10-CM

## 2022-07-28 ENCOUNTER — OFFICE VISIT (OUTPATIENT)
Dept: CARDIOLOGY CLINIC | Age: 67
End: 2022-07-28
Payer: MEDICARE

## 2022-07-28 VITALS
HEART RATE: 60 BPM | SYSTOLIC BLOOD PRESSURE: 128 MMHG | WEIGHT: 171 LBS | BODY MASS INDEX: 23.94 KG/M2 | OXYGEN SATURATION: 97 % | DIASTOLIC BLOOD PRESSURE: 72 MMHG | HEIGHT: 71 IN

## 2022-07-28 DIAGNOSIS — I10 PRIMARY HYPERTENSION: ICD-10-CM

## 2022-07-28 DIAGNOSIS — I49.9 IRREGULAR HEART BEAT: ICD-10-CM

## 2022-07-28 DIAGNOSIS — Z86.79 HX OF VIRAL PERICARDITIS: ICD-10-CM

## 2022-07-28 DIAGNOSIS — R07.9 CHEST PAIN, UNSPECIFIED TYPE: ICD-10-CM

## 2022-07-28 DIAGNOSIS — K21.9 GASTROESOPHAGEAL REFLUX DISEASE, UNSPECIFIED WHETHER ESOPHAGITIS PRESENT: ICD-10-CM

## 2022-07-28 DIAGNOSIS — I47.29 NSVT (NONSUSTAINED VENTRICULAR TACHYCARDIA): ICD-10-CM

## 2022-07-28 DIAGNOSIS — R00.1 BRADYCARDIA: ICD-10-CM

## 2022-07-28 DIAGNOSIS — R06.02 SOB (SHORTNESS OF BREATH): ICD-10-CM

## 2022-07-28 DIAGNOSIS — R00.2 PALPITATIONS: Primary | ICD-10-CM

## 2022-07-28 DIAGNOSIS — I49.5 SICK SINUS SYNDROME (HCC): ICD-10-CM

## 2022-07-28 PROCEDURE — 1101F PT FALLS ASSESS-DOCD LE1/YR: CPT | Performed by: INTERNAL MEDICINE

## 2022-07-28 PROCEDURE — G8510 SCR DEP NEG, NO PLAN REQD: HCPCS | Performed by: INTERNAL MEDICINE

## 2022-07-28 PROCEDURE — 1123F ACP DISCUSS/DSCN MKR DOCD: CPT | Performed by: INTERNAL MEDICINE

## 2022-07-28 PROCEDURE — G8752 SYS BP LESS 140: HCPCS | Performed by: INTERNAL MEDICINE

## 2022-07-28 PROCEDURE — 3017F COLORECTAL CA SCREEN DOC REV: CPT | Performed by: INTERNAL MEDICINE

## 2022-07-28 PROCEDURE — G8754 DIAS BP LESS 90: HCPCS | Performed by: INTERNAL MEDICINE

## 2022-07-28 PROCEDURE — G8427 DOCREV CUR MEDS BY ELIG CLIN: HCPCS | Performed by: INTERNAL MEDICINE

## 2022-07-28 PROCEDURE — G8420 CALC BMI NORM PARAMETERS: HCPCS | Performed by: INTERNAL MEDICINE

## 2022-07-28 PROCEDURE — G8536 NO DOC ELDER MAL SCRN: HCPCS | Performed by: INTERNAL MEDICINE

## 2022-07-28 PROCEDURE — 99215 OFFICE O/P EST HI 40 MIN: CPT | Performed by: INTERNAL MEDICINE

## 2022-07-28 NOTE — PROGRESS NOTES
History of Present Illness:  74 YOM here for follow up. I last saw him the end of June. He has a history of sick sinus syndrome, mild chronotropic intolerance. Event monitor showed average heart rate about 60s, occasionally he will check his heart rate and it will go down to 40. He had wide complex tachycardia, nonsustained VT versus aberrancy, 14 beats, was started on Metoprolol 25 mg twice daily. He had some fatigue and dizziness and it was decreased down to 12.5. He now has some headaches and occasional dizziness as well with some rare atypical chest pain, non exertional.  He is here to discuss. No syncope. Impression:  History of wide complex tachycardia with nonsustained VT versus aberrancy. I suspect more likely aberrancy. History of sick sinus syndrome with chronotropic intolerance with event monitor showing heart rate 60 bpm and blunted heart rate response to activity. No syncope. HTN. Echo June 2021 with normal function, but mild aortic root dilatation. Atypical chest pain. GERD, on PPI. Remote parathyroidectomy with recent blood work, but not available to me. Remote pericarditis. Plan: It is unclear if this is all just due to bradycardia, however his heart rate was not profoundly decreased during event monitor, but he did have nonsustained VT, so ideally a low dose beta blocker would be reasonable. I do, however, suspect this is more likely aberrancy and possible atrial tachycardia. I have asked him to stop his Metoprolol completely for now. I am going to obtain an echocardiogram, especially given his chest pain and remote pericarditis. I will also obtain a pharmacological cardiac nuclear stress test for completeness.   I would like to see him back in about a month and if his symptoms are still ongoing, my plan would be to consider subcutaneous loop recorder and if no arrhythmias are directly correlated with his symptoms, I would defer to his primary physician for further evaluation. Past Medical History:   Diagnosis Date    Anxiety     Arthritis     Enlarged prostate     Essential hypertension        Current Outpatient Medications   Medication Sig Dispense Refill    metoprolol succinate (TOPROL-XL) 25 mg XL tablet Take 1 Tablet by mouth daily. (Patient taking differently: Take 25 mg by mouth in the morning. Pt taking 1/2 tab daily) 30 Tablet 3    sertraline (ZOLOFT) 100 mg tablet Take 100 mg by mouth daily. tamsulosin (FLOMAX) 0.4 mg capsule Take 0.4 mg by mouth daily. omeprazole (PRILOSEC) 20 mg capsule Take 20 mg by mouth daily. lisinopriL (PRINIVIL, ZESTRIL) 10 mg tablet Take 10 mg by mouth daily. Social History   reports that he has quit smoking. He has quit using smokeless tobacco.   reports current alcohol use of about 1.0 standard drink per week. Family History  family history includes Diabetes in his father; Heart Surgery in his father. Review of Systems  Except as stated above include:  Constitutional: Negative for fever, chills and malaise/fatigue. HEENT: No congestion or recent URI. Gastrointestinal: No nausea, vomiting, abdominal pain, bloody stools. Pulmonary:  Negative except as stated above. Cardiac:  Negative except as stated above. Musculoskeletal: Negative except as stated above. Neurological:  No localized symptoms. Skin:  Negative except as stated above. Psych:  Negative except as stated above. Endocrine:  Negative except as stated above. PHYSICAL EXAM  BP Readings from Last 3 Encounters:   07/28/22 128/72   07/18/22 110/72   06/29/22 118/62     Pulse Readings from Last 3 Encounters:   07/28/22 60   07/18/22 (!) 48   06/29/22 (!) 43     Wt Readings from Last 3 Encounters:   07/28/22 77.6 kg (171 lb)   07/18/22 77.8 kg (171 lb 9.6 oz)   06/29/22 78 kg (172 lb)     General:   Well developed, well groomed. Head/Neck:   No obvious jugular venous distention     No obvious carotid pulsations.       No evidence of xanthelasma. Lungs:   No respiratory distress. Clear bilaterally. Heart:  Regular rate and rhythm. Normal S1/S2. Palpation grossly normal.    No significant murmurs, rubs or gallops. Abdomen:   Non-acute abdomen. No obvious pulsations. Extremities:   Intact peripheral pulses. No significant edema. Neurological:   Alert and oriented to person, place, time. No focal neurological deficit visually. Skin:   No obvious rash    Blood Pressure Metric:  Monitor recommended and adjustments stated if needed.

## 2022-07-28 NOTE — PROGRESS NOTES
Arabella Mckenzie presents today for   Chief Complaint   Patient presents with    Follow-up     1 wk    Dizziness    Shortness of Breath     exertion       Arabella Mckenzie preferred language for health care discussion is english/other. Is someone accompanying this pt? yes    Is the patient using any DME equipment during [address]? no    Depression Screening:  3 most recent PHQ Screens 7/28/2022   Little interest or pleasure in doing things Not at all   Feeling down, depressed, irritable, or hopeless Not at all   Total Score PHQ 2 0       Learning Assessment:  Learning Assessment 7/28/2022   PRIMARY LEARNER Patient   PRIMARY LANGUAGE ENGLISH   LEARNER PREFERENCE PRIMARY DEMONSTRATION   ANSWERED BY patient   RELATIONSHIP SELF       Abuse Screening:  Abuse Screening Questionnaire 7/28/2022   Do you ever feel afraid of your partner? N   Are you in a relationship with someone who physically or mentally threatens you? N   Is it safe for you to go home? Y       Fall Risk  Fall Risk Assessment, last 12 mths 7/28/2022   Able to walk? Yes   Fall in past 12 months? 0   Do you feel unsteady? 0   Are you worried about falling 0           Pt currently taking Anticoagulant therapy? no    Pt currently taking Antiplatelet therapy ? no      Coordination of Care:  1. Have you been to the ER, urgent care clinic since your last visit? Hospitalized since your last visit? no    2. Have you seen or consulted any other health care providers outside of the [address] [place] [address] since your last visit? Include any pap smears or colon screening.  no

## 2022-07-29 NOTE — TELEPHONE ENCOUNTER
Wife call, patient on speakerphone. She sted that pt had been started on medtoprolol last visit with Nahomy Bansal NP, took the 25 mg every day, but she said it was giving patient headaches every day, pt was feeling fatigued, napping daily which pt says in not normal for pt; his HR waas 47-52, /60's- 137/70's. She said that in the last couple of days, he has cut tab in half and is only taking 12.5 mg a day and wondering if he should continue taking. Verbal order and read back per Ghanshyam Munoz, NP   Have pt cont to take 1/2 dose metoprolol until he sees Dr Rene Ojeda Thursday. Called pt back, and explained the above. They agreed with plan. Will see Dr Rene Ojeda on Thursday.

## 2022-09-07 ENCOUNTER — TELEPHONE (OUTPATIENT)
Dept: CARDIOLOGY CLINIC | Age: 67
End: 2022-09-07

## 2022-09-08 ENCOUNTER — OFFICE VISIT (OUTPATIENT)
Dept: CARDIOLOGY CLINIC | Age: 67
End: 2022-09-08
Payer: MEDICARE

## 2022-09-08 VITALS
DIASTOLIC BLOOD PRESSURE: 64 MMHG | HEART RATE: 57 BPM | WEIGHT: 173 LBS | HEIGHT: 71 IN | BODY MASS INDEX: 24.22 KG/M2 | SYSTOLIC BLOOD PRESSURE: 120 MMHG | OXYGEN SATURATION: 98 %

## 2022-09-08 DIAGNOSIS — R00.2 PALPITATIONS: ICD-10-CM

## 2022-09-08 DIAGNOSIS — Z86.79 HX OF VIRAL PERICARDITIS: ICD-10-CM

## 2022-09-08 DIAGNOSIS — R00.1 BRADYCARDIA: ICD-10-CM

## 2022-09-08 DIAGNOSIS — I47.29 NSVT (NONSUSTAINED VENTRICULAR TACHYCARDIA): ICD-10-CM

## 2022-09-08 DIAGNOSIS — R07.9 CHEST PAIN, UNSPECIFIED TYPE: Primary | ICD-10-CM

## 2022-09-08 DIAGNOSIS — I49.5 SICK SINUS SYNDROME (HCC): ICD-10-CM

## 2022-09-08 PROCEDURE — 3017F COLORECTAL CA SCREEN DOC REV: CPT | Performed by: INTERNAL MEDICINE

## 2022-09-08 PROCEDURE — G8536 NO DOC ELDER MAL SCRN: HCPCS | Performed by: INTERNAL MEDICINE

## 2022-09-08 PROCEDURE — 99215 OFFICE O/P EST HI 40 MIN: CPT | Performed by: INTERNAL MEDICINE

## 2022-09-08 PROCEDURE — G8754 DIAS BP LESS 90: HCPCS | Performed by: INTERNAL MEDICINE

## 2022-09-08 PROCEDURE — G8427 DOCREV CUR MEDS BY ELIG CLIN: HCPCS | Performed by: INTERNAL MEDICINE

## 2022-09-08 PROCEDURE — 1123F ACP DISCUSS/DSCN MKR DOCD: CPT | Performed by: INTERNAL MEDICINE

## 2022-09-08 PROCEDURE — G8420 CALC BMI NORM PARAMETERS: HCPCS | Performed by: INTERNAL MEDICINE

## 2022-09-08 PROCEDURE — 1101F PT FALLS ASSESS-DOCD LE1/YR: CPT | Performed by: INTERNAL MEDICINE

## 2022-09-08 PROCEDURE — G8752 SYS BP LESS 140: HCPCS | Performed by: INTERNAL MEDICINE

## 2022-09-08 PROCEDURE — G8432 DEP SCR NOT DOC, RNG: HCPCS | Performed by: INTERNAL MEDICINE

## 2022-09-08 RX ORDER — TIMOLOL MALEATE 5 MG/ML
SOLUTION/ DROPS OPHTHALMIC
COMMUNITY
Start: 2022-08-15

## 2022-09-08 RX ORDER — DESMOPRESSIN ACETATE 0.2 MG/1
0.2 TABLET ORAL
COMMUNITY
Start: 2022-09-01

## 2022-09-08 NOTE — PROGRESS NOTES
History of Present Illness:  74 YOM here for follow up. He is accompanied by his wife, who is also a patient of mine. When I saw him at the end of July I was worried about bradycardia in the setting of beta blocker and causing fatigue. His heart rate would go down to 40s. I stopped it and he felt better for a period of time, but about a week later he was diagnosed with COVID and then he had rebound. He continues to have some ongoing generalized fatigue as a result. His wife also had COVID, but she recovered without any residual symptoms. His stress test and echo were reviewed today without obvious ischemia and normal EF. Impression:  History of wide complex tachycardia, which I suspect is aberrancy. No history of syncope. He is now off beta blocker due to fatigue and SSS. SSS, chronotropic intolerance with blunted heart rate response to activity. 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 HTN. Echo September 2022 with normal function. Atypical chest pain with nuclear stress test September 2022 without obvious ischemia. Remote pericarditis. Remote parathyroidectomy. GERD, on PPI. Plan:  I discussed the stress test and echocardiogram and there is no evidence of ischemia, structurally normal heart. Since off the beta blocker his heart rate has never gone less than 50s. He seems to be doing better, but the COVID infection may be clouding some of his symptoms. 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 I will see back in six months unless there is a clinical change. Past Medical History:   Diagnosis Date    Anxiety     Arthritis     Enlarged prostate     Essential hypertension        Current Outpatient Medications   Medication Sig Dispense Refill    desmopressin (DDAVP) 0.2 mg tablet Take 0.2 mg by mouth nightly.       timolol (TIMOPTIC) 0.5 % ophthalmic solution       sertraline (ZOLOFT) 100 mg tablet Take 100 mg by mouth daily. tamsulosin (FLOMAX) 0.4 mg capsule Take 0.4 mg by mouth daily. omeprazole (PRILOSEC) 20 mg capsule Take 20 mg by mouth daily. lisinopriL (PRINIVIL, ZESTRIL) 10 mg tablet Take 10 mg by mouth daily. Social History   reports that he has quit smoking. He has quit using smokeless tobacco.   reports current alcohol use of about 1.0 standard drink per week. Family History  family history includes Diabetes in his father; Heart Surgery in his father. Review of Systems  Except as stated above include:  Constitutional: Negative for fever, chills and malaise/fatigue. HEENT: No congestion or recent URI. Gastrointestinal: No nausea, vomiting, abdominal pain, bloody stools. Pulmonary:  Negative except as stated above. Cardiac:  Negative except as stated above. Musculoskeletal: Negative except as stated above. Neurological:  No localized symptoms. Skin:  Negative except as stated above. Psych:  Negative except as stated above. Endocrine:  Negative except as stated above. PHYSICAL EXAM  BP Readings from Last 3 Encounters:   09/08/22 120/64   09/08/22 128/72   09/08/22 110/60     Pulse Readings from Last 3 Encounters:   09/08/22 (!) 57   07/28/22 60   07/18/22 (!) 48     Wt Readings from Last 3 Encounters:   09/08/22 78.5 kg (173 lb)   09/08/22 77.6 kg (171 lb)   09/08/22 77.6 kg (171 lb)     General:   Well developed, well groomed. Head/Neck:   No obvious jugular venous distention     No obvious carotid pulsations. No evidence of xanthelasma. Lungs:   No respiratory distress. Clear bilaterally. Heart:  Regular rate and rhythm. Normal S1/S2. Palpation grossly normal.    No significant murmurs, rubs or gallops. Abdomen:   Non-acute abdomen. No obvious pulsations. Extremities:   Intact peripheral pulses. No significant edema.     Neurological:   Alert and oriented to person, place, time. No focal neurological deficit visually. Skin:   No obvious rash    Blood Pressure Metric:  Monitor recommended and adjustments stated if needed.

## 2022-09-08 NOTE — PROGRESS NOTES
Gabi Bartlett presents today for No chief complaint on file. Gabi Bartlett preferred language for health care discussion is english/other. Is someone accompanying this pt? no    Is the patient using any DME equipment during [address]? no    Depression Screening:  3 most recent PHQ Screens 7/28/2022   Little interest or pleasure in doing things Not at all   Feeling down, depressed, irritable, or hopeless Not at all   Total Score PHQ 2 0       Learning Assessment:  Learning Assessment 7/28/2022   PRIMARY LEARNER Patient   PRIMARY LANGUAGE ENGLISH   LEARNER PREFERENCE PRIMARY DEMONSTRATION   ANSWERED BY patient   RELATIONSHIP SELF       Abuse Screening:  Abuse Screening Questionnaire 7/28/2022   Do you ever feel afraid of your partner? N   Are you in a relationship with someone who physically or mentally threatens you? N   Is it safe for you to go home? Y       Fall Risk  Fall Risk Assessment, last 12 mths 7/28/2022   Able to walk? Yes   Fall in past 12 months? 0   Do you feel unsteady? 0   Are you worried about falling 0       Pt currently taking Anticoagulant therapy? no    Coordination of Care:  1. Have you been to the ER, urgent care clinic since your last visit? Hospitalized since your last visit? no    2. Have you seen or consulted any other health care providers outside of the [address] [place] [address] since your last visit? Include any pap smears or colon screening.  no

## 2023-03-02 ENCOUNTER — OFFICE VISIT (OUTPATIENT)
Age: 68
End: 2023-03-02

## 2023-03-02 VITALS
SYSTOLIC BLOOD PRESSURE: 120 MMHG | BODY MASS INDEX: 24.41 KG/M2 | OXYGEN SATURATION: 96 % | DIASTOLIC BLOOD PRESSURE: 64 MMHG | HEART RATE: 62 BPM | WEIGHT: 175 LBS

## 2023-03-02 DIAGNOSIS — I10 PRIMARY HYPERTENSION: ICD-10-CM

## 2023-03-02 DIAGNOSIS — R00.0 WIDE-COMPLEX TACHYCARDIA: Primary | ICD-10-CM

## 2023-03-02 DIAGNOSIS — I49.5 SICK SINUS SYNDROME (HCC): ICD-10-CM

## 2023-03-02 NOTE — PROGRESS NOTES
Marcos Aguilars presents today for   Chief Complaint   Patient presents with    Follow-up     6 month, sx cl for knee sx on 3/27/23       Marcos Tony preferred language for health care discussion is english/other. Is someone accompanying this pt? wife    Is the patient using any DME equipment during [facility]? no    Depression Screening:  Depression: Not at risk    PHQ-2 Score: 0         Learning Assessment:  No question data found. Abuse Screening:  No flowsheet data found. Fall Risk  Amb Fall Risk Assessment and TUG Test 7/28/2022   Fall in past 12 months? 0   Able to walk? Yes         Pt currently taking Anticoagulant therapy? no    Coordination of Care:  1. Have you been to the ER, urgent care clinic since your last visit? Hospitalized since your last visit? no    2. Have you seen or consulted any other health care providers outside of the [address] [place] [address] since your last visit? Include any pap smears or colon screening.  no

## 2023-03-02 NOTE — PROGRESS NOTES
History of Present Illness:  69 YOM here for evaluation for planned knee surgery on March 27th. No new chest pain, dyspnea, presyncope, syncope, PND, orthopnea or edema. He retired in December and has been quite busy at home as he had the passing of a family member in hospice. Impression:  DJD, for preoperative evaluation March 27th, low risk due to recent testing. History of sick sinus syndrome, chronotropic intolerance, blunted heart rate response, but still able to work reasonably well, recently retired. History of atypical chest pain with echocardiogram September 2022 showing normal function and stress test September 2022 low risk without ischemia. Remote pericarditis. Remote pericardiectomy. GERD. Plan:  I reviewed his stress test and echocardiogram and both were essentially normal, no evidence of ischemia and no new symptoms. He does have a history of mild sick sinus syndrome and will need to be monitored for any anesthesia, but in general low risk from a cardiac standpoint. All questions answered. Wt Readings from Last 3 Encounters:   03/02/23 175 lb (79.4 kg)   09/08/22 173 lb (78.5 kg)   09/08/22 171 lb (77.6 kg)     No past medical history on file. Current Outpatient Medications   Medication Sig Dispense Refill    desmopressin (DDAVP) 0.2 MG tablet Take 0.2 mg by mouth      lisinopril (PRINIVIL;ZESTRIL) 10 MG tablet Take 10 mg by mouth daily      omeprazole (PRILOSEC) 20 MG delayed release capsule Take 20 mg by mouth daily      sertraline (ZOLOFT) 100 MG tablet Take 100 mg by mouth daily      tamsulosin (FLOMAX) 0.4 MG capsule Take 0.4 mg by mouth daily      timolol (TIMOPTIC) 0.5 % ophthalmic solution ceived the following from [facility]: Outside name: timolol (TIMOPTIC) 0.5 % ophthalmic solution       No current facility-administered medications for this visit.        Social History   has no history on file for tobacco use.   has no history on file for alcohol use.    Family History  family history is not on file. Review of Systems  Except as stated above include:  Constitutional: Negative for fever, chills and malaise/fatigue. HEENT: No congestion or recent URI. Gastrointestinal: No nausea, vomiting, abdominal pain, bloody stools. Pulmonary:  Negative except as stated above. Cardiac:  Negative except as stated above. Musculoskeletal: Negative except as stated above. Neurological:  No localized symptoms. Skin:  Negative except as stated above. Psych:  Negative except as stated above. Endocrine:  Negative except as stated above. PHYSICAL EXAM  BP Readings from Last 3 Encounters:   03/02/23 120/64   09/08/22 120/64   09/08/22 128/72     Pulse Readings from Last 3 Encounters:   03/02/23 62   09/08/22 57   07/28/22 60     General:   Well developed, well groomed. Head/Neck:   No obvious jugular venous distention     No obvious carotid pulsations. No evidence of xanthelasma. Lungs:   No respiratory distress. Clear bilaterally. Heart:  Regular rate and rhythm. Normal S1/S2. Palpation grossly normal.    No significant murmurs, rubs or gallops. Abdomen:   Non-acute abdomen. No obvious pulsations. Extremities:   Intact peripheral pulses. No significant edema. Neurological:   Alert and oriented to person, place, time. No focal neurological deficit visually. Skin:   No obvious rash    Blood Pressure Metric:  Monitor recommended and adjustments stated if needed.

## 2023-03-24 ENCOUNTER — OFFICE VISIT (OUTPATIENT)
Dept: NEUROLOGY | Age: 68
End: 2023-03-24
Payer: MEDICARE

## 2023-03-24 VITALS
DIASTOLIC BLOOD PRESSURE: 72 MMHG | HEART RATE: 71 BPM | WEIGHT: 173 LBS | OXYGEN SATURATION: 96 % | SYSTOLIC BLOOD PRESSURE: 110 MMHG | BODY MASS INDEX: 24.13 KG/M2

## 2023-03-24 DIAGNOSIS — R41.3 MEMORY LOSS: ICD-10-CM

## 2023-03-24 DIAGNOSIS — R41.3 MEMORY LOSS: Primary | ICD-10-CM

## 2023-03-24 PROCEDURE — 99205 OFFICE O/P NEW HI 60 MIN: CPT | Performed by: NURSE PRACTITIONER

## 2023-03-24 PROCEDURE — G8420 CALC BMI NORM PARAMETERS: HCPCS | Performed by: NURSE PRACTITIONER

## 2023-03-24 PROCEDURE — 1123F ACP DISCUSS/DSCN MKR DOCD: CPT | Performed by: NURSE PRACTITIONER

## 2023-03-24 PROCEDURE — G8427 DOCREV CUR MEDS BY ELIG CLIN: HCPCS | Performed by: NURSE PRACTITIONER

## 2023-03-24 PROCEDURE — 1101F PT FALLS ASSESS-DOCD LE1/YR: CPT | Performed by: NURSE PRACTITIONER

## 2023-03-24 PROCEDURE — G8536 NO DOC ELDER MAL SCRN: HCPCS | Performed by: NURSE PRACTITIONER

## 2023-03-24 PROCEDURE — 3017F COLORECTAL CA SCREEN DOC REV: CPT | Performed by: NURSE PRACTITIONER

## 2023-03-24 PROCEDURE — G8432 DEP SCR NOT DOC, RNG: HCPCS | Performed by: NURSE PRACTITIONER

## 2023-03-24 NOTE — PROGRESS NOTES
Memory has been poor for years, recently became concerns   Repeats questions, and conversations  High anxiety and frustration with daily life

## 2023-03-25 LAB
T3FREE SERPL-MCNC: 2.5 PG/ML (ref 2.2–4)
T4 FREE SERPL-MCNC: 0.9 NG/DL (ref 0.8–1.5)
TSH SERPL DL<=0.05 MIU/L-ACNC: 3.47 UIU/ML (ref 0.36–3.74)

## 2023-04-19 ENCOUNTER — TELEPHONE (OUTPATIENT)
Dept: NEUROLOGY | Age: 68
End: 2023-04-19

## 2023-04-26 ENCOUNTER — HOSPITAL ENCOUNTER (OUTPATIENT)
Dept: MRI IMAGING | Age: 68
End: 2023-04-26
Payer: MEDICARE

## 2023-04-26 ENCOUNTER — HOSPITAL ENCOUNTER (OUTPATIENT)
Dept: NEUROLOGY | Age: 68
Discharge: HOME | End: 2023-04-26
Payer: MEDICARE

## 2023-04-26 DIAGNOSIS — R41.3 MEMORY LOSS: ICD-10-CM

## 2023-04-26 LAB — CREAT BLD-MCNC: 0.8 MG/DL (ref 0.6–1.3)

## 2023-04-26 PROCEDURE — A9577 INJ MULTIHANCE: HCPCS

## 2023-04-26 PROCEDURE — 70553 MRI BRAIN STEM W/O & W/DYE: CPT

## 2023-04-26 PROCEDURE — 95816 EEG AWAKE AND DROWSY: CPT

## 2023-04-26 PROCEDURE — 74011250636 HC RX REV CODE- 250/636

## 2023-04-26 PROCEDURE — 82565 ASSAY OF CREATININE: CPT

## 2023-04-26 RX ADMIN — GADOBENATE DIMEGLUMINE 15 ML: 529 INJECTION, SOLUTION INTRAVENOUS at 08:20

## 2023-04-29 NOTE — PROCEDURES
[facility]  EEG    Name:  Elinor Moses  MR#:  [id number]  :  1955  ACCOUNT #:  [de-identified]  DATE OF SERVICE:  2023      DIAGNOSIS:  Memory loss. DESCRIPTION:  Recording is done digitally on computer. Electrodes are placed in a 10-20 international system. Initial recording is equipment calibration followed by biocalibration. Initial montages are bipolar montage. TECHNIQUE:  Tracings started with the patient drowsy. As recording continues, the patient is hooked up to an EKG machine that shows a heart rate of 78. Tracings continue with the patient staying drowsy throughout. He is exposed to photic stimulation without any abnormality. Hyperventilation is not done. IMPRESSION:  This is an EEG showing the patient drowsy.       MD OSCAR [id number]  D:  2023 13:40  T:  2023 22:00  JOB #:  [id number]

## 2023-05-01 NOTE — TELEPHONE ENCOUNTER
Patients wife would like a call from Дмитрий Shanks to set up appointment with Jacki Lyles.     Please contact

## 2023-05-18 ENCOUNTER — TELEMEDICINE (OUTPATIENT)
Age: 68
End: 2023-05-18
Payer: MEDICARE

## 2023-05-18 DIAGNOSIS — R41.3 MEMORY LOSS: Primary | ICD-10-CM

## 2023-05-18 DIAGNOSIS — R41.840 ATTENTION AND CONCENTRATION DEFICIT: ICD-10-CM

## 2023-05-18 DIAGNOSIS — F41.8 ANXIETY ABOUT HEALTH: ICD-10-CM

## 2023-05-18 DIAGNOSIS — Z86.59 HISTORY OF DEPRESSION: ICD-10-CM

## 2023-05-18 DIAGNOSIS — R47.89 WORD FINDING DIFFICULTY: ICD-10-CM

## 2023-05-18 PROCEDURE — 4004F PT TOBACCO SCREEN RCVD TLK: CPT | Performed by: PSYCHOLOGIST

## 2023-05-18 PROCEDURE — 1123F ACP DISCUSS/DSCN MKR DOCD: CPT | Performed by: PSYCHOLOGIST

## 2023-05-18 PROCEDURE — 90791 PSYCH DIAGNOSTIC EVALUATION: CPT | Performed by: PSYCHOLOGIST

## 2023-05-18 ASSESSMENT — GERIATRIC DEPRESSION SCALE (GDS)
ARE YOU BASICALLY SATISFIED WITH YOUR LIFE: 0
DO YOU OFTEN FEEL DOWNHEARTED AND BLUE: 0
DO YOU OFTEN GET RESTLESS AND FIDGETY: 0
DO YOU THINK IT IS WONDERFUL TO BE ALIVE NOW: 0

## 2023-05-18 NOTE — PROGRESS NOTES
[facility]  [address], [address], [address] [place], [address]  Office:  [phone]  Fax: [phone]                  Initial Office Exam  Patient Name: Rosalinda Alejandro  Age: 69 y.o. Gender: male   Handedness: right handed   Presenting Concern: memory loss; history of depression  Primary Care Physician: Allison Osorio MD  Referring Provider: SAMMY Chavez      REASON FOR REFERRAL:  This comprehensive and medically necessary neuropsychological assessment was requested to assist a differential diagnosis of cognitive and psychological concerns. The use and purpose of this examination, as well as the extent and limitations of confidentiality, were explained prior to obtaining permission to participate. Instructions were provided regarding the necessity to put forth optimal effort and answer questions truthfully in order to obtain reliable and accurate test results. REVIEW OF RECORDS:  Mr. Romel Parmar was referred by neurology where he is followed for memory loss, difficulties with focus and attention, and slowed processing speed. He has also noticed increased anxiety while driving, a dislike of crowds, trouble following conversations and using expressive language abilities. These difficulties first emerged a couple of years ago and are now worsening. There is a personal history of concussions and a possible family history of dementia. Appetite and sleep are normal and depression/anxiety have been denied (zoloft prescribed). 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 He enjoys television and music. An EEG and Carotid doppler have been ordered. An MRI of the brain on 4/20/23 showed the following:  Minimal nonspecific white matter changes.  No acute intracranial abnormality      Current Outpatient Medications   Medication Sig Dispense Refill    desmopressin (DDAVP) 0.2 MG tablet Take 0.2 mg by mouth      lisinopril (PRINIVIL;ZESTRIL) 10 MG tablet Take 10 mg by mouth

## 2023-05-22 ENCOUNTER — TELEPHONE (OUTPATIENT)
Age: 68
End: 2023-05-22

## 2023-05-22 NOTE — TELEPHONE ENCOUNTER
Patients wife called and stated her  has not taken all of the tests that he was suppose to take before his next appointment with Baljit Grant. She would like to know should he keep the June appointment?     Please contact

## 2023-06-23 ENCOUNTER — TELEMEDICINE (OUTPATIENT)
Age: 68
End: 2023-06-23
Payer: MEDICARE

## 2023-06-23 ENCOUNTER — TELEPHONE (OUTPATIENT)
Age: 68
End: 2023-06-23

## 2023-06-23 DIAGNOSIS — R45.89 SYMPTOMS OF DEPRESSION: ICD-10-CM

## 2023-06-23 DIAGNOSIS — F41.9 ANXIETY: ICD-10-CM

## 2023-06-23 DIAGNOSIS — R41.3 MEMORY DEFICIT: Primary | ICD-10-CM

## 2023-06-23 DIAGNOSIS — G47.33 OSA (OBSTRUCTIVE SLEEP APNEA): Primary | ICD-10-CM

## 2023-06-23 PROCEDURE — 90832 PSYTX W PT 30 MINUTES: CPT | Performed by: PSYCHOLOGIST

## 2023-06-23 PROCEDURE — 4004F PT TOBACCO SCREEN RCVD TLK: CPT | Performed by: PSYCHOLOGIST

## 2023-06-23 PROCEDURE — 1123F ACP DISCUSS/DSCN MKR DOCD: CPT | Performed by: PSYCHOLOGIST

## 2023-06-23 NOTE — PROGRESS NOTES
Interactive Psychotherapy/office feedback        Interactive office feedback session with Mr. Winston Gutierrez and his wife. I reviewed the results of the recent Neuropsychological Evaluation  including the observed areas of neurocognitive strengths and weaknesses. Education was provided regarding my diagnostic impressions, and treatment plan/options were discussed. I also answered numerous questions related to the clinical findings, including the various methods to improve cognition and mood. CBT, psychoeducation, and supportive psychotherapy techniques were utilized. Patient's report placed in [employer] per patient request.     Prior to seeing the patient I reviewed the records, including the previously completed report, the records in [facility], and any updated visits from other providers since I saw the patient last.       Diagnoses:      Memory Deficits  Anxiety   Symptoms of Depression    The patient will follow up with the referring provider, and reported being very pleased with the services provided. [facility] [address] psychotherapy 30 Minutes      This note was created using voice recognition software. Despite editing, there may be syntax errors. Tri Elizondo is a 69 y.o. male who was evaluated by an audio-video encounter for concerns as above. Patient identification was verified prior to start of the visit. Pursuant to the emergency declaration under the [facility], 1135 waiver authority and the [employer] General Act, this Virtual Visit was conducted, with patient's (and/or legal guardian's) consent, to reduce the patient's risk of exposure to COVID-19 and provide necessary medical care. Services were provided through a synchronous discussion virtually to substitute for in-person clinic visit. I was at home. The patient was at home.

## 2023-07-07 ENCOUNTER — TELEPHONE (OUTPATIENT)
Age: 68
End: 2023-07-07

## 2023-07-25 ENCOUNTER — OFFICE VISIT (OUTPATIENT)
Age: 68
End: 2023-07-25
Payer: MEDICARE

## 2023-07-25 VITALS
TEMPERATURE: 97.1 F | HEART RATE: 63 BPM | DIASTOLIC BLOOD PRESSURE: 74 MMHG | SYSTOLIC BLOOD PRESSURE: 136 MMHG | RESPIRATION RATE: 20 BRPM | OXYGEN SATURATION: 98 %

## 2023-07-25 DIAGNOSIS — R47.89 WORD FINDING DIFFICULTY: ICD-10-CM

## 2023-07-25 DIAGNOSIS — F41.9 ANXIETY: ICD-10-CM

## 2023-07-25 DIAGNOSIS — Z86.59 HISTORY OF DEPRESSION: ICD-10-CM

## 2023-07-25 DIAGNOSIS — R41.3 MEMORY DEFICIT: Primary | ICD-10-CM

## 2023-07-25 PROCEDURE — 1036F TOBACCO NON-USER: CPT

## 2023-07-25 PROCEDURE — 1123F ACP DISCUSS/DSCN MKR DOCD: CPT

## 2023-07-25 PROCEDURE — 99204 OFFICE O/P NEW MOD 45 MIN: CPT

## 2023-07-25 PROCEDURE — G8420 CALC BMI NORM PARAMETERS: HCPCS

## 2023-07-25 PROCEDURE — G8427 DOCREV CUR MEDS BY ELIG CLIN: HCPCS

## 2023-07-25 PROCEDURE — 3017F COLORECTAL CA SCREEN DOC REV: CPT

## 2023-07-25 NOTE — PROGRESS NOTES
Kevin Pitt is a 69 y.o. male who presents with the following  Chief Complaint   Patient presents with    Follow-up     Test results        HPI  Patient is here today with his wife for neuropsych testing follow-up. Patient was seen March 24, 2023 by Joanne Garcia NP at which time the patient was complaining of trouble with focusing, attention, following conversations, sense of direction, getting words out, anxiety with driving in traffic. Miguel Avelar ordered a check of his thyroid which was normal, an MRI of the brain that showed minimal nonspecific white matter changes with no acute intercranial abnormality. A carotid Doppler that showed 1 to 15% bilateral stenosis, and EEG that was normal.  He ordered cognitive testing which was done with Dr. Niecy Faulkner on June 9, 2023. Per Dr. Niecy Faulkner, she stated that she was not prepared to view this as a cognitive disorder, but would like him to be tested again in 12 months. She recommended pharmacotherapy for cognitive efficiency, exercise, minimal alcohol consumption, psychotherapy and a sleep study. She states that he has a lot of anxiety and symptoms of depression and the patient's wife agrees. Not on File    Current Outpatient Medications   Medication Sig Dispense Refill    desmopressin (DDAVP) 0.2 MG tablet Take 1 tablet by mouth      lisinopril (PRINIVIL;ZESTRIL) 10 MG tablet Take 1 tablet by mouth daily      omeprazole (PRILOSEC) 20 MG delayed release capsule Take 1 capsule by mouth daily      sertraline (ZOLOFT) 100 MG tablet Take 1 tablet by mouth daily      tamsulosin (FLOMAX) 0.4 MG capsule Take 1 capsule by mouth daily      timolol (TIMOPTIC) 0.5 % ophthalmic solution ceived the following from [facility]: Outside name: timolol (TIMOPTIC) 0.5 % ophthalmic solution       No current facility-administered medications for this visit.         Social History     Tobacco Use   Smoking Status Former   Smokeless Tobacco Former       Past Medical

## 2023-08-07 ENCOUNTER — TELEPHONE (OUTPATIENT)
Age: 68
End: 2023-08-07

## 2023-08-07 NOTE — TELEPHONE ENCOUNTER
Patient would like his test results faxed to Dr. Rolando Anderson office.     Office # [phone]      Patient did not have the fax #    Please contact

## 2023-09-07 ENCOUNTER — OFFICE VISIT (OUTPATIENT)
Age: 68
End: 2023-09-07

## 2023-09-07 VITALS
HEIGHT: 71 IN | SYSTOLIC BLOOD PRESSURE: 120 MMHG | HEART RATE: 60 BPM | BODY MASS INDEX: 24.08 KG/M2 | DIASTOLIC BLOOD PRESSURE: 70 MMHG | WEIGHT: 172 LBS | OXYGEN SATURATION: 98 %

## 2023-09-07 DIAGNOSIS — R00.1 BRADYCARDIA: Primary | ICD-10-CM

## 2023-09-07 DIAGNOSIS — I10 PRIMARY HYPERTENSION: ICD-10-CM

## 2023-09-07 RX ORDER — PAROXETINE 10 MG/1
10 TABLET, FILM COATED ORAL DAILY
COMMUNITY
Start: 2023-08-15

## 2023-09-07 RX ORDER — TOLTERODINE 4 MG/1
4 CAPSULE, EXTENDED RELEASE ORAL DAILY
COMMUNITY
Start: 2022-10-06

## 2023-09-07 RX ORDER — PREDNISONE 2.5 MG/1
2.5 TABLET ORAL DAILY PRN
COMMUNITY
Start: 2023-05-15

## 2023-09-07 NOTE — PROGRESS NOTES
daily      tamsulosin (FLOMAX) 0.4 MG capsule Take 1 capsule by mouth daily       No current facility-administered medications for this visit. Social History   reports that he has quit smoking. He has quit using smokeless tobacco.   reports current alcohol use of about 1.0 standard drink per week. Family History  family history includes Diabetes in his father; Heart Surgery in his father. Review of Systems  Except as stated above include:  Constitutional: Negative for fever, chills and malaise/fatigue. HEENT: No congestion or recent URI. Gastrointestinal: No nausea, vomiting, abdominal pain, bloody stools. Pulmonary:  Negative except as stated above. Cardiac:  Negative except as stated above. Musculoskeletal: Negative except as stated above. Neurological:  No localized symptoms. Skin:  Negative except as stated above. Psych:  Negative except as stated above. Endocrine:  Negative except as stated above. PHYSICAL EXAM  BP Readings from Last 3 Encounters:   09/07/23 120/70   07/25/23 136/74   03/24/23 110/72     Pulse Readings from Last 3 Encounters:   09/07/23 60   07/25/23 63   03/24/23 71     General:   Well developed, well groomed. Head/Neck:   No obvious jugular venous distention     No obvious carotid pulsations. No evidence of xanthelasma. Lungs:   No respiratory distress. Clear bilaterally. Heart:  Regular rate and rhythm. Normal S1/S2. Palpation grossly normal.    No significant murmurs, rubs or gallops. Abdomen:   Non-acute abdomen. No obvious pulsations. Extremities:   Intact peripheral pulses. No significant edema. Neurological:   Alert and oriented to person, place, time. No focal neurological deficit visually. Skin:   No obvious rash    Blood Pressure Metric:  Monitor recommended and adjustments stated if needed.

## 2023-09-11 ASSESSMENT — SLEEP AND FATIGUE QUESTIONNAIRES
DO YOU HAVE DIFFICULTY WATCHING A MOVIE OR VIDEO BECAUSE YOU BECOME SLEEPY OR TIRED: NO
WHAT TIME DO YOU USUALLY GO TO BED: 22:00
DO YOU HAVE DIFFICULTY CONCENTRATING ON THE THINGS YOU DO BECAUSE YOU ARE SLEEPY OR TIRED: YES, A LITTLE
HOW LIKELY ARE YOU TO NOD OFF OR FALL ASLEEP WHILE SITTING AND READING: 0
HOW LIKELY ARE YOU TO NOD OFF OR FALL ASLEEP WHILE SITTING INACTIVE IN A PUBLIC PLACE: 0
HAS YOUR RELATIONSHIP WITH FAMILY, FRIENDS OR WORK COLLEAGUES BEEN AFFECTED BECAUSE YOU ARE SLEEPY OR TIRED: NO
DO YOU GENERALLY HAVE DIFFICULTY REMEMBERING THINGS BECAUSE YOU ARE SLEEPY OR TIRED: YES, MODERATE
HOW LIKELY ARE YOU TO NOD OFF OR FALL ASLEEP IN A CAR, WHILE STOPPED FOR A FEW MINUTES IN TRAFFIC: WOULD NEVER DOZE
HOW LIKELY ARE YOU TO NOD OFF OR FALL ASLEEP WHILE WATCHING TV: 2
AVERAGE NUMBER OF SLEEP HOURS: 7
ARE YOU BOTHERED BY WAKING UP TOO EARLY AND NOT BEING ABLE TO GET BACK TO SLEEP: IS NOT
HOW LIKELY ARE YOU TO NOD OFF OR FALL ASLEEP WHILE SITTING INACTIVE IN A PUBLIC PLACE: WOULD NEVER DOZE
HOW LONG DO YOU NAP: 30 TO 45 MINUTES
HOW MANY NAPS DO YOU TAKE PER WEEK: 2
SELECT ANY OF THE FOLLOWING BEHAVIORS OBSERVED WHILE YOU ARE ASLEEP: LIGHT SNORING
SELECT ANY OF THE FOLLOWING BEHAVIORS OBSERVED WHILE YOU ARE ASLEEP: TWITCHING OF LEGS OR FEET
DO YOU HAVE DIFFICULTY BEING AS ACTIVE AS YOU WANT TO BE IN THE EVENING BECAUSE YOU ARE SLEEPY OR TIRED: YES, MODERATE
ESS TOTAL SCORE: 6
SELECT ANY OF THE FOLLOWING BEHAVIORS OBSERVED WHILE PATIENT ASLEEP: LIGHT SNORING;TWITCHING OF LEGS OR FEET
DO YOU HAVE DIFFICULTY BEING AS ACTIVE AS YOU WANT TO BE IN THE MORNING BECAUSE YOU ARE SLEEPY OR TIRED: YES, MODERATE
HOW LIKELY ARE YOU TO NOD OFF OR FALL ASLEEP WHILE LYING DOWN TO REST IN THE AFTERNOON WHEN CIRCUMSTANCES PERMIT: HIGH CHANCE OF DOZING
HOW LIKELY ARE YOU TO NOD OFF OR FALL ASLEEP WHILE SITTING QUIETLY AFTER LUNCH WITHOUT ALCOHOL: SLIGHT CHANCE OF DOZING
HOW LIKELY ARE YOU TO NOD OFF OR FALL ASLEEP IN A CAR, WHILE STOPPED FOR A FEW MINUTES IN TRAFFIC: 0
DO YOU HAVE DIFFICULTY VISITING YOUR FAMILY OR FRIENDS IN THEIR HOME BECAUSE YOU BECOME SLEEPY OR TIRED: NO
ARE YOU BOTHERED BY WAKING UP TOO EARLY AND NOT BEING ABLE TO GET BACK TO SLEEP: NO
HOW LIKELY ARE YOU TO NOD OFF OR FALL ASLEEP WHEN YOU ARE A PASSENGER IN A CAR FOR AN HOUR WITHOUT A BREAK: 0
HOW LIKELY ARE YOU TO NOD OFF OR FALL ASLEEP WHILE SITTING AND READING: WOULD NEVER DOZE
DO YOU GET TOO LITTLE SLEEP AT NIGHT: DOES
DO YOU WORK SHIFTS: NO
NUMBER OF TIMES YOU WAKE PER NIGHT: 3
WHAT TIME DO YOU USUALLY WAKE UP: 06:30
AVERAGE NUMBER OF SLEEP HOURS: 7
HOW LIKELY ARE YOU TO NOD OFF OR FALL ASLEEP WHILE LYING DOWN TO REST IN THE AFTERNOON WHEN CIRCUMSTANCES PERMIT: 3
HOW LIKELY ARE YOU TO NOD OFF OR FALL ASLEEP WHILE SITTING AND TALKING TO SOMEONE: 0
HAS YOUR MOOD BEEN AFFECTED BECAUSE YOU ARE SLEEPY OR TIRED: YES, MODERATE
DO YOU HAVE DIFFICULTY OPERATING A MOTOR VEHICLE FOR LONG DISTANCES (GREATER THAN 100 MILES) BECAUSE YOU BECOME SLEEPY: YES, A LITTLE
DO YOU HAVE PROBLEMS WITH FREQUENT AWAKENINGS AT NIGHT: YES
HOW LIKELY ARE YOU TO NOD OFF OR FALL ASLEEP WHEN YOU ARE A PASSENGER IN A CAR FOR AN HOUR WITHOUT A BREAK: WOULD NEVER DOZE
DO YOU GET TOO LITTLE SLEEP AT NIGHT: YES
HOW LIKELY ARE YOU TO NOD OFF OR FALL ASLEEP WHILE WATCHING TV: MODERATE CHANCE OF DOZING
DO YOU TAKE NAPS: YES
HOW LIKELY ARE YOU TO NOD OFF OR FALL ASLEEP WHILE SITTING QUIETLY AFTER LUNCH WITHOUT ALCOHOL: 1
FOSQ SCORE: 15
HOW LIKELY ARE YOU TO NOD OFF OR FALL ASLEEP WHILE SITTING AND TALKING TO SOMEONE: WOULD NEVER DOZE
DO YOU HAVE DIFFICULTY OPERATING A MOTOR VEHICLE FOR SHORT DISTANCES (LESS THAN 100 MILES) BECAUSE YOU BECOME SLEEPY: NO

## 2023-09-13 ENCOUNTER — TELEMEDICINE (OUTPATIENT)
Facility: CLINIC | Age: 68
End: 2023-09-13

## 2023-09-13 ENCOUNTER — HOSPITAL ENCOUNTER (OUTPATIENT)
Facility: HOSPITAL | Age: 68
Discharge: HOME | End: 2023-09-16
Payer: MEDICARE

## 2023-09-13 DIAGNOSIS — G47.33 OSA (OBSTRUCTIVE SLEEP APNEA): ICD-10-CM

## 2023-09-13 DIAGNOSIS — G47.33 OSA (OBSTRUCTIVE SLEEP APNEA): Primary | ICD-10-CM

## 2023-09-13 DIAGNOSIS — R47.01 ANOMIC APHASIA: ICD-10-CM

## 2023-09-13 DIAGNOSIS — I10 PRIMARY HYPERTENSION: ICD-10-CM

## 2023-09-13 DIAGNOSIS — I49.5 SICK SINUS SYNDROME (HCC): ICD-10-CM

## 2023-09-13 PROCEDURE — 95810 POLYSOM 6/> YRS 4/> PARAM: CPT | Performed by: INTERNAL MEDICINE

## 2023-09-13 ASSESSMENT — PATIENT HEALTH QUESTIONNAIRE - PHQ9
SUM OF ALL RESPONSES TO PHQ QUESTIONS 1-9: 0
2. FEELING DOWN, DEPRESSED OR HOPELESS: 0
1. LITTLE INTEREST OR PLEASURE IN DOING THINGS: 0
SUM OF ALL RESPONSES TO PHQ QUESTIONS 1-9: 0
SUM OF ALL RESPONSES TO PHQ QUESTIONS 1-9: 0
SUM OF ALL RESPONSES TO PHQ9 QUESTIONS 1 & 2: 0
SUM OF ALL RESPONSES TO PHQ QUESTIONS 1-9: 0

## 2023-09-13 NOTE — PROGRESS NOTES
[facility] [place]. [place], [address]   Tel.  [phone]   Fax. [facility], [address] [place] [address]   Tel.  [phone]   Fax. [phone]  [facility], [facility]   Tel.  [phone]   Fax. [phone]     Chief Complaint:      Chief Complaint   Patient presents with    New Patient     Referred per ALMAZ Rodriguez for evaluation for CHERELLE. Subjective:     Maxine Powell is a 69y.o. year old male referred by Dr. Aye Bhakta for evaluation of a sleep disorder. he reports he has experienced excessive daytime sleepiness for a  number year (s) and it has worsened. The sleepiness is described as being moderate, he has been taking naps during the day - over the last 2 years during the summer time. He notes that he experiences fatigue when seated and inactive and at [employer]. The severity of the day time fatigue has impacted the ability to function normally during the day. He reports he has been snoring for a number of years and his snoring has stayed the same. The snoring is not exacerbated by sleeping supine. He has noted problems with concentration and memory, he reports he has experienced elevated blood pressure, non-restorative sleep, and waking with gasping/choking. The patient notes he retires at 89 and awakens at [facility] and that he will experience frequent awakening from sleep. In general he is able to return to sleep after awakening, he tends to awaken spontaneously. The patient has not undergone diagnostic testing for the current problems. Review of Systems:  Constitutional:  negative weight gain   Eyes:  negative blurred vision.   CVS:  No significant chest pain  Pulm:  No significant shortness of breath  GI:  No significant nausea or vomiting  :  positive nocturia   Musculoskeletal:  No significant joint pain at night  Skin:  No significant rashes  Neuro:  No significant dizziness   Psych:  No active

## 2023-09-13 NOTE — PATIENT INSTRUCTIONS
label. Alcohol naturally makes you sleepy and on its own can cause accidents. Combined with excessive drowsiness its effects are amplified. Signs of Drowsy Driving:   * You don't remember driving the last few miles   * You may drift out of your ajay   * You are unable to focus and your thoughts wander   * You may yawn more often than normal   * You have difficulty keeping your eyes open / nodding off   * Missing traffic signs, speeding, or tailgating  Prevention-   Good sleep hygiene, lifestyle and behavioral choices have the most impact on drowsy driving. There is no substitute for sleep and the average person requires 8 hours nightly. If you find yourself driving drowsy, stop and sleep. Consider the sleep hygiene tips provided during your visit as well. Medication Refill Policy: Refills for all medications require 1 week advance notice. Please have your pharmacy fax a refill request. We are unable to fax, or call in \"controled substance\" medications and you will need to pick these prescriptions up from our office.

## 2023-09-14 VITALS — DIASTOLIC BLOOD PRESSURE: 73 MMHG | OXYGEN SATURATION: 98 % | SYSTOLIC BLOOD PRESSURE: 124 MMHG | HEART RATE: 62 BPM

## 2023-09-14 NOTE — PROGRESS NOTES
[address] [facility].[place], [address]  Tel.  [phone]  Fax. [phone] [facility], [facility] [place]  Tel.  [phone]  Fax. [phone] [facility] [address] [facility]  Tel.  [phone]  Fax. [phone]     Sleep Study Technical Notes  Disclaimer:  all notes have not been confirmed by interpreting physician      PRE-Test:  Alphonso Elizondo (: 1955) arrived in the lobby. Patient was greeted, temperature checked (97.1 ) and screening questions asked. The patient was taken directly to their assigned room. BP (124/73) and SpO2 (98%) were taken. Procedure was explained to the patient and questions were answered. Pt expressed understanding. Electrodes were applied without incident. The patient was placed in bed and the study was started. Acquisition Notes:  Lights off: 9:10pm  Sleep onset: 9:25pm  Other comments: The study ordered was a PSG. The hookup was completed without issue. The patient was placed in bed and the lights were off at 9:10pm. Sleep onset occurred at approximately 9:25pm.    During the study, stages N1, N2 and REM were noted. The patient slept on all sides. Mild snoring was heard. The patient appeared to show signs of sleep-disordered breathing. Lights were on at 3:15am per Pt request.    POST Test:  Patient was awakened. Electrodes were removed. The patient was discharged after completing the Post Questionnaire. Patient stated that they were alert and ok to drive. Equipment and room cleaned per infection control policy.

## 2023-09-26 ENCOUNTER — TELEPHONE (OUTPATIENT)
Age: 68
End: 2023-09-26

## 2023-09-26 DIAGNOSIS — G47.33 OSA (OBSTRUCTIVE SLEEP APNEA): Primary | ICD-10-CM

## 2023-09-27 NOTE — TELEPHONE ENCOUNTER
Tesfaye Pang is to be contacted by sleep technologists regarding results of Sleep Testing which was indicative of an average AHI of 7.8 per hour with an SpO2 avel of 88%, the duration of SpO2 <88% was 0.0 minutes. * A second polysomnogram has been ordered for mask fitting, PAP desensitizing protocol, and pressure titration. Encounter Diagnosis   Name Primary?     CHERELLE (obstructive sleep apnea) Yes       Orders Placed This Encounter   Procedures    SLEEP LAB (PAP TITRATION)     Standing Status:   Future     Standing Expiration Date:   9/26/2024

## 2023-09-28 NOTE — PROGRESS NOTES
Bonifacio Khalil is a 69 y.o. male who presents with the following  Chief Complaint   Patient presents with    New Patient       HPI    New patient comes in with wife for memory concerns  He does have a paper written down which would put in the media but will continue to talk about his issues here    He has developed significant issues with his cognitive skills  He has trouble focusing and keeping his attention  He has increased trouble with processing and speed    He does get some frustration with doing his daily activities  He gets very anxious in traffic and has no real sense of direction anymore  He does not like big crowds  He has trouble following and continuing conversations  A lot of times he has trouble getting out his words but he does not know what he wants to say  It does eventually come to him but he has to slow down and really think about things  All of these has been going on for the past couple years  He is definitely noticed it is gotten a lot worse  We think there might have been some dementia and the family but were not sure  He has had some concussions  He is interested in looking at his thyroid  He is sleeping fine  He is eating fine  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 timolol (TIMOPTIC) 0.5 % ophthalmic solution     sertraline (ZOLOFT) 100 mg tablet Take 100 mg by mouth daily. tamsulosin (FLOMAX) 0.4 mg capsule Take 0.4 mg by mouth daily.     omeprazole (PRILOSEC) 20 mg capsule Take 20 mg Medication: valACYclovir (VALTREX) 500 MG tablet  Last office visit date: 10/18/22  Next appointment scheduled?: Yes   Number of refills given: 2   by mouth daily. lisinopriL (PRINIVIL, ZESTRIL) 10 mg tablet Take 10 mg by mouth daily. No current facility-administered medications for this visit. Social History     Tobacco Use   Smoking Status Former   Smokeless Tobacco Former   Tobacco Comments    quit at age of 27       Past Medical History:   Diagnosis Date    Anxiety     Arthritis     Enlarged prostate     Essential hypertension        Past Surgical History:   Procedure Laterality Date    HX APPENDECTOMY      HX PARTIAL THYROIDECTOMY  2016    HX SKIN BIOPSY         Family History   Problem Relation Age of Onset    Heart Surgery Father     Diabetes Father        Social History     Socioeconomic History    Marital status:    Tobacco Use    Smoking status: Former    Smokeless tobacco: Former    Tobacco comments:     quit at age of 27   Vaping Use    Vaping Use: Never used   Substance and Sexual Activity    Alcohol use: Yes     Alcohol/week: 1.0 standard drink     Types: 1 Cans of beer per week     Comment: daily    Drug use: Never    Sexual activity: Yes       Review of Systems   Eyes:  Negative for blurred vision, double vision and photophobia. Respiratory:  Negative for shortness of breath and wheezing. Cardiovascular:  Negative for chest pain and palpitations. Gastrointestinal:  Negative for nausea and vomiting. Neurological:  Negative for seizures, loss of consciousness and weakness. Psychiatric/Behavioral:  Positive for memory loss. Negative for depression, substance abuse and suicidal ideas. Remainder of comprehensive review is negative. Physical Exam :    Visit Vitals  /72 (BP 1 Location: Left upper arm, BP Patient Position: Sitting, BP Cuff Size: Adult)   Pulse 71   Wt 78.5 kg (173 lb)   SpO2 96%   BMI 24.13 kg/m²       General: Well defined, nourished, and groomed individual in no acute distress. Musculoskeletal: Extremities revealed no edema and had full range of motion of joints.     Psych: Good mood and bright affect    NEUROLOGICAL EXAMINATION:    Mental Status: Alert and oriented to person, place, and time. Recite 3/3- MMSE 29     Cranial Nerves:    II, III, IV, VI: Visual acuity grossly intact. Visual fields are normal.    Pupils are equal, round, and reactive to light and accommodation. Extra-ocular movements are full and fluid. Fundoscopic exam was benign, no ptosis or nystagmus. V-XII: Hearing is grossly intact. Facial features are symmetric, with normal sensation and strength. The palate rises symmetrically and the tongue protrudes midline. Sternocleidomastoids 5/5. Motor Examination: Normal tone, bulk, and strength, 5/5 muscle strength throughout. Coordination: Finger to nose was normal. No resting or intention tremor    Gait and Station: Steady while walking. Normal arm swing. No pronator drift. No muscle wasting or fasiculations noted. Reflexes: DTRs 2+ throughout.       Results for orders placed or performed in visit on 09/08/22   ECHO ADULT COMPLETE   Result Value Ref Range    IVSd 1.2 (A) 0.6 - 1.0 cm    LVIDd 4.3 4.2 - 5.9 cm    LVIDs 3.2 cm    LVOT Diameter 2.0 cm    LVPWd 1.2 (A) 0.6 - 1.0 cm    LVOT Peak Gradient 3 mmHg    LVOT Mean Gradient 2 mmHg    LVOT SV 64.1 ml    LVOT Peak Velocity 0.8 m/s    LVOT VTI 20.4 cm    LA Volume A/L 76 mL    LA Volume 2C 79 (A) 18 - 58 mL    LA Volume 4C 62 (A) 18 - 58 mL    MV A Velocity 0.71 m/s    MV E Wave Deceleration Time 260.8 ms    MV E Velocity 0.80 m/s    LV E' Lateral Velocity 10 cm/s    LV E' Septal Velocity 10 cm/s    Aortic Root 3.4 cm    Pulm Vein A Velocity 0.3 m/s    Pulm Vein A Duration 121.8 ms    Fractional Shortening 2D 26 28 - 44 %    LVIDd Index 2.18 cm/m2    LVIDs Index 1.62 cm/m2    LV RWT Ratio 0.56     LV Mass 2D 184.7 88 - 224 g    LV Mass 2D Index 93.7 49 - 115 g/m2    MV E/A 1.13     E/E' Ratio (Averaged) 8.00     E/E' Lateral 8.00     E/E' Septal 8.00     LA Volume Index A/L 39 16 - 34 mL/m2    LVOT Stroke Volume Index 32.5 mL/m2    LVOT Area 3.1 cm2    LA Volume Index 2C 40 (A) 16 - 34 mL/m2    LA Volume Index 4C 31 16 - 34 mL/m2    Ao Root Index 1.73 cm/m2       Total time: 40 min   Counseling / coordination time: 35 min   > 50% counseling / coordination?: Yes re: medications, treatment, disease process, imaging. Orders Placed This Encounter    MRI BRAIN W WO CONT     Standing Status:   Future     Standing Expiration Date:   4/24/2024     Order Specific Question:   STAT Creatinine as indicated     Answer:   Yes    TSH 3RD GENERATION     Standing Status:   Future     Number of Occurrences:   1     Standing Expiration Date:   3/24/2024    T4, FREE     Standing Status:   Future     Number of Occurrences:   1     Standing Expiration Date:   3/24/2024    T3, FREE     Standing Status:   Future     Number of Occurrences:   1     Standing Expiration Date:   3/24/2024    [place] [facility] EM[facility]     Referral Priority:   Routine     Referral Type:   Consultation     Referral Reason:   Specialty Services Required     Referred to Provider:   Yamileth Chirinos PHD     Number of Visits Requested:   1    EEG     Standing Status:   Future     Standing Expiration Date:   9/24/2023     Order Specific Question:   Reason for Exam:     Answer:   memory loss    DUPLEX CAROTID BILATERAL     Standing Status:   Future     Standing Expiration Date:   9/24/2023       1.  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

## 2023-10-18 ENCOUNTER — HOSPITAL ENCOUNTER (OUTPATIENT)
Facility: HOSPITAL | Age: 68
Discharge: HOME | End: 2023-10-21
Payer: MEDICARE

## 2023-10-18 DIAGNOSIS — G47.33 OSA (OBSTRUCTIVE SLEEP APNEA): ICD-10-CM

## 2023-10-18 PROCEDURE — 95811 POLYSOM 6/>YRS CPAP 4/> PARM: CPT | Performed by: INTERNAL MEDICINE

## 2023-10-19 VITALS
TEMPERATURE: 97.3 F | DIASTOLIC BLOOD PRESSURE: 71 MMHG | HEART RATE: 64 BPM | SYSTOLIC BLOOD PRESSURE: 118 MMHG | OXYGEN SATURATION: 98 %

## 2023-10-19 NOTE — PROGRESS NOTES
[facility].[place], [address]  Tel.  [phone]  Fax. [address] [facility], [address] [place] [address]  Tel.  [phone]  Fax. [phone] [facility], [address] [facility]  Tel.  [phone]  Fax. [phone]     Sleep Study Technical Notes  Disclaimer:  all notes have not been confirmed by interpreting physician      Acquisition Notes:  Lights off: 7:41pm  Sleep onset: 7:51pm  PAP titration: 6cm H2O   Mask(s) Used: ResMed N30i Medium Nasal Pillow  Other comments: The study ordered was a PAP Titration. The hookup was completed without issue. The Pt was started on PAP at 6cm H2O. The mask utilized was a ResMed N30i Medium Nasal Pillow. The patient was placed in bed and the lights were off at 7:41pm. Sleep onset occurred at approximately 7:51pm.     During the study, stages N1, N2, and REM were noted. The patient slept both sides and supine. Mild snoring was heard. PAP was titrated accordingly to a final resting pressure of BIPAP 11/7cm H2O due to a central component. Lights were on at 1:45am per Pt request.    POST Test:  Patient was awakened. Electrodes were removed. The patient was discharged after completing the Post Questionnaire. Patient stated that they were alert and ok to drive. Equipment and room cleaned per infection control policy.

## 2023-10-27 ENCOUNTER — TELEPHONE (OUTPATIENT)
Age: 68
End: 2023-10-27

## 2023-10-27 DIAGNOSIS — G47.31 COMPLEX SLEEP APNEA SYNDROME: Primary | ICD-10-CM

## 2023-10-27 NOTE — TELEPHONE ENCOUNTER
Polysomnography with PAP titration interpreted, CPAP failure due to poor continuous pressure tolerance and emergence of Central Apnea. Patient will need to be evaluated to discuss treatment options.

## 2023-11-02 ENCOUNTER — CLINICAL DOCUMENTATION (OUTPATIENT)
Age: 68
End: 2023-11-02

## 2023-11-02 ENCOUNTER — TELEMEDICINE (OUTPATIENT)
Age: 68
End: 2023-11-02
Payer: MEDICARE

## 2023-11-02 DIAGNOSIS — I10 PRIMARY HYPERTENSION: ICD-10-CM

## 2023-11-02 DIAGNOSIS — G47.31 COMPLEX SLEEP APNEA SYNDROME: Primary | ICD-10-CM

## 2023-11-02 DIAGNOSIS — F41.9 ANXIETY AND DEPRESSION: ICD-10-CM

## 2023-11-02 DIAGNOSIS — F32.A ANXIETY AND DEPRESSION: ICD-10-CM

## 2023-11-02 PROCEDURE — G8427 DOCREV CUR MEDS BY ELIG CLIN: HCPCS | Performed by: INTERNAL MEDICINE

## 2023-11-02 PROCEDURE — 1123F ACP DISCUSS/DSCN MKR DOCD: CPT | Performed by: INTERNAL MEDICINE

## 2023-11-02 PROCEDURE — 1036F TOBACCO NON-USER: CPT | Performed by: INTERNAL MEDICINE

## 2023-11-02 PROCEDURE — G8484 FLU IMMUNIZE NO ADMIN: HCPCS | Performed by: INTERNAL MEDICINE

## 2023-11-02 PROCEDURE — 99203 OFFICE O/P NEW LOW 30 MIN: CPT | Performed by: INTERNAL MEDICINE

## 2023-11-02 PROCEDURE — 3017F COLORECTAL CA SCREEN DOC REV: CPT | Performed by: INTERNAL MEDICINE

## 2023-11-02 PROCEDURE — G8420 CALC BMI NORM PARAMETERS: HCPCS | Performed by: INTERNAL MEDICINE

## 2023-11-02 ASSESSMENT — SLEEP AND FATIGUE QUESTIONNAIRES
DO YOU HAVE DIFFICULTY CONCENTRATING ON THE THINGS YOU DO BECAUSE YOU ARE SLEEPY OR TIRED: YES, MODERATE
HOW LIKELY ARE YOU TO NOD OFF OR FALL ASLEEP WHILE SITTING QUIETLY AFTER LUNCH WITHOUT ALCOHOL: 1
HOW LIKELY ARE YOU TO NOD OFF OR FALL ASLEEP WHILE SITTING INACTIVE IN A PUBLIC PLACE: WOULD NEVER DOZE
DO YOU GET TOO LITTLE SLEEP AT NIGHT: DOES NOT
SELECT ANY OF THE FOLLOWING BEHAVIORS OBSERVED WHILE YOU ARE ASLEEP: LIGHT SNORING
HOW LIKELY ARE YOU TO NOD OFF OR FALL ASLEEP WHILE SITTING AND READING: 0
HOW LIKELY ARE YOU TO NOD OFF OR FALL ASLEEP WHILE SITTING AND TALKING TO SOMEONE: 0
HAS YOUR MOOD BEEN AFFECTED BECAUSE YOU ARE SLEEPY OR TIRED: NO
DO YOU HAVE DIFFICULTY BEING AS ACTIVE AS YOU WANT TO BE IN THE EVENING BECAUSE YOU ARE SLEEPY OR TIRED: YES, LITTLE
HOW LIKELY ARE YOU TO NOD OFF OR FALL ASLEEP IN A CAR, WHILE STOPPED FOR A FEW MINUTES IN TRAFFIC: WOULD NEVER DOZE
HOW LIKELY ARE YOU TO NOD OFF OR FALL ASLEEP WHEN YOU ARE A PASSENGER IN A CAR FOR AN HOUR WITHOUT A BREAK: SLIGHT CHANCE OF DOZING
DO YOU WORK SHIFTS: NO
SELECT ANY OF THE FOLLOWING BEHAVIORS OBSERVED WHILE YOU ARE ASLEEP: PAUSES IN BREATHING
HOW LIKELY ARE YOU TO NOD OFF OR FALL ASLEEP WHEN YOU ARE A PASSENGER IN A CAR FOR AN HOUR WITHOUT A BREAK: 1
ARE YOU BOTHERED BY WAKING UP TOO EARLY AND NOT BEING ABLE TO GET BACK TO SLEEP: IS NOT
DO YOU HAVE DIFFICULTY BEING AS ACTIVE AS YOU WANT TO BE IN THE MORNING BECAUSE YOU ARE SLEEPY OR TIRED: NO
DO YOU HAVE PROBLEMS WITH FREQUENT AWAKENINGS AT NIGHT: YES
SELECT ANY OF THE FOLLOWING BEHAVIORS OBSERVED WHILE YOU ARE ASLEEP: TWITCHING OF LEGS OR FEET
DO YOU GENERALLY HAVE DIFFICULTY REMEMBERING THINGS BECAUSE YOU ARE SLEEPY OR TIRED: YES, A LITTLE
DO YOU GET TOO LITTLE SLEEP AT NIGHT: NO
HOW LIKELY ARE YOU TO NOD OFF OR FALL ASLEEP WHILE WATCHING TV: 1
DO YOU TAKE NAPS: NO
AVERAGE NUMBER OF SLEEP HOURS: 7
HOW LIKELY ARE YOU TO NOD OFF OR FALL ASLEEP WHILE LYING DOWN TO REST IN THE AFTERNOON WHEN CIRCUMSTANCES PERMIT: 2
AVERAGE NUMBER OF SLEEP HOURS: 7
HOW LIKELY ARE YOU TO NOD OFF OR FALL ASLEEP WHILE SITTING QUIETLY AFTER LUNCH WITHOUT ALCOHOL: SLIGHT CHANCE OF DOZING
ARE YOU BOTHERED BY WAKING UP TOO EARLY AND NOT BEING ABLE TO GET BACK TO SLEEP: NO
HOW LIKELY ARE YOU TO NOD OFF OR FALL ASLEEP WHILE SITTING INACTIVE IN A PUBLIC PLACE: 0
DO YOU HAVE DIFFICULTY WATCHING A MOVIE OR VIDEO BECAUSE YOU BECOME SLEEPY OR TIRED: NO
HOW LIKELY ARE YOU TO NOD OFF OR FALL ASLEEP WHILE LYING DOWN TO REST IN THE AFTERNOON WHEN CIRCUMSTANCES PERMIT: MODERATE CHANCE OF DOZING
HOW LIKELY ARE YOU TO NOD OFF OR FALL ASLEEP WHILE WATCHING TV: SLIGHT CHANCE OF DOZING
DO YOU HAVE DIFFICULTY OPERATING A MOTOR VEHICLE FOR LONG DISTANCES (GREATER THAN 100 MILES) BECAUSE YOU BECOME SLEEPY: YES, A LITTLE
HOW LIKELY ARE YOU TO NOD OFF OR FALL ASLEEP WHILE SITTING AND TALKING TO SOMEONE: WOULD NEVER DOZE
ESS TOTAL SCORE: 5
HOW LIKELY ARE YOU TO NOD OFF OR FALL ASLEEP WHILE SITTING AND READING: WOULD NEVER DOZE
DO YOU HAVE DIFFICULTY VISITING YOUR FAMILY OR FRIENDS IN THEIR HOME BECAUSE YOU BECOME SLEEPY OR TIRED: NO
HOW LIKELY ARE YOU TO NOD OFF OR FALL ASLEEP IN A CAR, WHILE STOPPED FOR A FEW MINUTES IN TRAFFIC: 0
DO YOU HAVE DIFFICULTY OPERATING A MOTOR VEHICLE FOR SHORT DISTANCES (LESS THAN 100 MILES) BECAUSE YOU BECOME SLEEPY: NO
HAS YOUR RELATIONSHIP WITH FAMILY, FRIENDS OR WORK COLLEAGUES BEEN AFFECTED BECAUSE YOU ARE SLEEPY OR TIRED: NO
SELECT ANY OF THE FOLLOWING BEHAVIORS OBSERVED WHILE YOU ARE ASLEEP: KICKING WITH LEGS
NUMBER OF TIMES YOU WAKE PER NIGHT: 3
FOSQ SCORE: 17.5

## 2023-11-02 NOTE — PROGRESS NOTES
needed, Disp: , Rfl:     lisinopril (PRINIVIL;ZESTRIL) 10 MG tablet, Take 1 tablet by mouth daily, Disp: , Rfl:     omeprazole (PRILOSEC) 20 MG delayed release capsule, Take 1 capsule by mouth daily, Disp: , Rfl:      He  has a past medical history of Anxiety, Arthritis, Enlarged prostate, and Essential hypertension. He  has a past surgical history that includes Appendectomy; skin biopsy; and Thyroidectomy, partial (2016). He family history includes Diabetes in his father; Heart Surgery in his father. He  reports that he has quit smoking. He has quit using smokeless tobacco. He reports current alcohol use of about 1.0 standard drink of alcohol per week. He reports that he does not use drugs. The patient has undergone diagnostic testing for the current problems. Attended nocturnal polysomnography (NPSG) performed on 09-13-23 showed an AHI of 7.8/hr with a lowest SpO2 of 88%, duration of SpO2 < 88% 0.00 minutes. He returned for a PAP titration on 10-18-23 was noted to fail a trial of CPAP therapy and was switched Bi-Level therapy. Central Sleep Apnea was noted to emerge on Bi-Level PAP therapy. Total AHI was 3.5 hour, A total of 19 events were scored of which 16 were central apneas. Review of Systems:  Constitutional:  No significant weight loss or weight gain  Eyes:  No blurred vision  CVS:  No significant chest pain  Pulm:  No significant shortness of breath  GI:  No significant nausea or vomiting  :  + significant nocturia  Musculoskeletal:  No significant joint pain at night  Skin:  No significant rashes  Neuro:  No significant dizziness   Psych:  No active mood issues    Sleep Review of Systems: notable for Negative difficulty falling asleep; Positive awakenings at night; Positive perceived regular dreaming; Negative nightmares; Negative  early morning headaches; Positive  memory problems; Positive  concentration issues;  Negative caffeine;  Negative alcohol;   Negative history of any

## 2023-11-02 NOTE — PATIENT INSTRUCTIONS
[address] [facility].[place], [address]  Tel.  [phone]  Fax. [address] [facility], [address] [place] [address]  Tel.  [phone]  Fax. [phone] [facility], [address] [place] [address]  Tel.  [phone]  Fax. [phone]     Learning About CPAP for Sleep Apnea  What is CPAP? CPAP is a small machine that you use at home every night while you sleep. It increases air pressure in your throat to keep your airway open. When you have sleep apnea, this can help you sleep better so you feel much better. CPAP stands for \"continuous positive airway pressure. \"  The CPAP machine will have one of the following:  A mask that covers your nose and mouth  Prongs that fit into your nose  A mask that covers your nose only, the most common type. This type is called NCPAP. The N stands for \"nasal.\"  Why is it done? CPAP is usually the best treatment for obstructive sleep apnea. It is the first treatment choice and the most widely used. Your doctor may suggest CPAP if you have: Moderate to severe sleep apnea. Sleep apnea and coronary artery disease (CAD) or heart failure. How does it help? CPAP can help you have more normal sleep, so you feel less sleepy and more alert during the daytime. CPAP may help keep heart failure or other heart problems from getting worse. NCPAP may help lower your blood pressure. If you use CPAP, your bed partner may also sleep better because you are not snoring or restless. What are the side effects? Some people who use CPAP have:  A dry or stuffy nose and a sore throat. Irritated skin on the face. Sore eyes. Bloating. If you have any of these problems, work with your doctor to fix them. Here are some things you can try:  Be sure the mask or nasal prongs fit well. See if your doctor can adjust the pressure of your CPAP. If your nose is dry, try a humidifier.   If your nose is runny or stuffy, try decongestant medicine or a steroid

## 2024-02-06 ENCOUNTER — CLINICAL DOCUMENTATION (OUTPATIENT)
Age: 69
End: 2024-02-06

## 2024-02-06 ENCOUNTER — TELEMEDICINE (OUTPATIENT)
Age: 69
End: 2024-02-06
Payer: MEDICARE

## 2024-02-06 DIAGNOSIS — G47.33 OSA (OBSTRUCTIVE SLEEP APNEA): Primary | ICD-10-CM

## 2024-02-06 PROCEDURE — 3017F COLORECTAL CA SCREEN DOC REV: CPT | Performed by: INTERNAL MEDICINE

## 2024-02-06 PROCEDURE — G8427 DOCREV CUR MEDS BY ELIG CLIN: HCPCS | Performed by: INTERNAL MEDICINE

## 2024-02-06 PROCEDURE — G8484 FLU IMMUNIZE NO ADMIN: HCPCS | Performed by: INTERNAL MEDICINE

## 2024-02-06 PROCEDURE — 1123F ACP DISCUSS/DSCN MKR DOCD: CPT | Performed by: INTERNAL MEDICINE

## 2024-02-06 PROCEDURE — 99213 OFFICE O/P EST LOW 20 MIN: CPT | Performed by: INTERNAL MEDICINE

## 2024-02-06 PROCEDURE — G8420 CALC BMI NORM PARAMETERS: HCPCS | Performed by: INTERNAL MEDICINE

## 2024-02-06 PROCEDURE — 1036F TOBACCO NON-USER: CPT | Performed by: INTERNAL MEDICINE

## 2024-02-06 RX ORDER — TOLTERODINE 4 MG/1
4 CAPSULE, EXTENDED RELEASE ORAL DAILY
COMMUNITY
Start: 2024-01-23

## 2024-02-06 RX ORDER — FLUOROURACIL 50 MG/G
CREAM TOPICAL PRN
COMMUNITY
Start: 2023-11-01

## 2024-02-06 RX ORDER — SERTRALINE HYDROCHLORIDE 100 MG/1
100 TABLET, FILM COATED ORAL DAILY
COMMUNITY

## 2024-02-06 ASSESSMENT — SLEEP AND FATIGUE QUESTIONNAIRES
HOW LIKELY ARE YOU TO NOD OFF OR FALL ASLEEP WHILE SITTING QUIETLY AFTER LUNCH WITHOUT ALCOHOL: 1
HOW LIKELY ARE YOU TO NOD OFF OR FALL ASLEEP WHEN YOU ARE A PASSENGER IN A CAR FOR AN HOUR WITHOUT A BREAK: 0
HOW LIKELY ARE YOU TO NOD OFF OR FALL ASLEEP WHILE WATCHING TV: 1
ESS TOTAL SCORE: 5
HOW LIKELY ARE YOU TO NOD OFF OR FALL ASLEEP WHILE SITTING INACTIVE IN A PUBLIC PLACE: 0
HOW LIKELY ARE YOU TO NOD OFF OR FALL ASLEEP IN A CAR, WHILE STOPPED FOR A FEW MINUTES IN TRAFFIC: 0
HOW LIKELY ARE YOU TO NOD OFF OR FALL ASLEEP WHILE SITTING AND TALKING TO SOMEONE: 0
HOW LIKELY ARE YOU TO NOD OFF OR FALL ASLEEP WHILE LYING DOWN TO REST IN THE AFTERNOON WHEN CIRCUMSTANCES PERMIT: 2
HOW LIKELY ARE YOU TO NOD OFF OR FALL ASLEEP WHILE SITTING AND READING: 1

## 2024-02-06 NOTE — PROGRESS NOTES
[address], [place] [address]  Tel.  [phone]    Fax. [phone]     [address], [place] [address]  Tel.  [phone]    Fax. [phone] [address] [place] [address]  Tel.  [phone]    Fax. [phone]       Kit Maier (: 1955) is a 68 y.o. male, established patient, seen for positive airway pressure follow-up and evaluation of the following chief complaint(s):   Sleep Problem (1st Adherence/Compliance. Consent to [place] in [place]. Send link to [phone])       Kit Maier was last seen by me on 23, prior notes reviewed in detail.  Polysomnogram (PSG) performed on 23 was indicative of an average AHI of 7.8 per hour with an SpO2 avel of 88%, the duration of SpO2 <88% was 0.0 minutes.     ASSESSMENT/PLAN:   Diagnosis Orders   1. CHERELLE (obstructive sleep apnea)  DME Order for (Specify) as OP          On ResMed:  AirCurve 10 VAuto:      Settings:  Mode - VAuto    Max IPAP: 20 cmH2O    Min EPAP: 06 cmH2O    PS: 04 cmH2O    Sleep Apnea -   * He is adherent with PAP therapy and PAP continues to benefit patient and remains necessary for control of his sleep apnea. Change pressure setting to  11/07 cmH2O.    * PAP card download in 4 weeks.    * Mask evaluation done in the office - see tech notes.    * Supplies for his device were ordered as noted below:    Orders Placed This Encounter   Procedures    DME Order for (Specify) as OP     Diagnosis: (G47.33) CHERELLE (obstructive sleep apnea)  (primary encounter diagnosis)     Replacement Supplies for Positive Airway Pressure Therapy Device:   Duration of need: 99 months.      Full Face Mask 1 every 3 months.   Full Face Mask Cushion 1 per month.     Headgear 1 every 6 months.    Perform mask fit and provide patient with a full face mask.      Konstantin Valentine MD, FAASM; NPI: [id number]    Electronically signed. Date:- 2024     * He is familiar with the

## 2024-02-06 NOTE — PROGRESS NOTES
PAP supply order faxed to [employer]. Called and spoke to patient who confirmed DME company and was informed that the order was being sent. Patient scheduled for 6 month follow-up.

## 2024-02-14 ENCOUNTER — TELEPHONE (OUTPATIENT)
Age: 69
End: 2024-02-14

## 2024-02-14 NOTE — TELEPHONE ENCOUNTER
Jake's email 2/14/24/ @ 11:53AM stated:     The patient is scheduled for a supply replacement call on 2/21/24. This is based on eligibility from set up date.      I'll request the supply department to call him about eligibility requirements.

## 2024-02-14 NOTE — TELEPHONE ENCOUNTER
Patient called stating he received a call from [employer] about a supply order but they said they don't have an order for him which confused him since they called him about an order. After checking his chart I explained that his order was sent on 2/6/24. Patient asked for and given the phone number to call [employer] directly.   He called back and spoke with Shweta and said they didn't receive an order and she told him she will be resending the order.

## 2024-02-23 ENCOUNTER — TELEPHONE (OUTPATIENT)
Age: 69
End: 2024-02-23

## 2024-02-23 NOTE — TELEPHONE ENCOUNTER
Patient called stated he received a new full face mask instead of the nasal mask. Patient stated the pressure change was need to due to changing to a full face mask. Patient wants a call back to discuss.   Mobile Phone # [phone] if not reach can call [phone]

## 2024-02-26 NOTE — TELEPHONE ENCOUNTER
Spoke with patient and advised download looks good with minimal leak.   He is going to switch back and forth with FFM and nasal mask.

## 2024-03-06 ENCOUNTER — OFFICE VISIT (OUTPATIENT)
Age: 69
End: 2024-03-06
Payer: MEDICARE

## 2024-03-06 VITALS
BODY MASS INDEX: 25.1 KG/M2 | DIASTOLIC BLOOD PRESSURE: 80 MMHG | SYSTOLIC BLOOD PRESSURE: 130 MMHG | OXYGEN SATURATION: 98 % | HEART RATE: 62 BPM | WEIGHT: 180 LBS

## 2024-03-06 DIAGNOSIS — I10 PRIMARY HYPERTENSION: Primary | ICD-10-CM

## 2024-03-06 DIAGNOSIS — I49.5 SICK SINUS SYNDROME (HCC): ICD-10-CM

## 2024-03-06 PROCEDURE — 3079F DIAST BP 80-89 MM HG: CPT | Performed by: INTERNAL MEDICINE

## 2024-03-06 PROCEDURE — 1123F ACP DISCUSS/DSCN MKR DOCD: CPT | Performed by: INTERNAL MEDICINE

## 2024-03-06 PROCEDURE — G8428 CUR MEDS NOT DOCUMENT: HCPCS | Performed by: INTERNAL MEDICINE

## 2024-03-06 PROCEDURE — 3075F SYST BP GE 130 - 139MM HG: CPT | Performed by: INTERNAL MEDICINE

## 2024-03-06 PROCEDURE — 1036F TOBACCO NON-USER: CPT | Performed by: INTERNAL MEDICINE

## 2024-03-06 PROCEDURE — 99214 OFFICE O/P EST MOD 30 MIN: CPT | Performed by: INTERNAL MEDICINE

## 2024-03-06 PROCEDURE — 3017F COLORECTAL CA SCREEN DOC REV: CPT | Performed by: INTERNAL MEDICINE

## 2024-03-06 PROCEDURE — G8419 CALC BMI OUT NRM PARAM NOF/U: HCPCS | Performed by: INTERNAL MEDICINE

## 2024-03-06 PROCEDURE — G8484 FLU IMMUNIZE NO ADMIN: HCPCS | Performed by: INTERNAL MEDICINE

## 2024-03-06 NOTE — PROGRESS NOTES
History of Present Illness:  68 year-old male here for followup.  He is accompanied by his wife.  He had right knee surgery last year and had some residual pain from it.  He recently had a stem cell injection to his left knee and seems to be doing better.  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  No new chest pain.  Still some occasional anxiety.  He has also been treated for sleep apnea the past few months and seems to have some better energy, at least per his wife.      Impression:   Workup for mild cognitive impairment last year unremarkable, but there was some mild anxiety.   Diagnosis of sleep apnea last year now on CPAP machine.   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   Atypical chest pain with echocardiogram and stress test 09/2022 low risk with normal EF.   Remote pericardiectomy.   Hypertension, well controlled on Lisinopril.   GERD.      Plan:  He has remote pericarditis without any symptoms.  He has mild bradycardia.  It seems to have improved after selling his farm.  He recently had a knee injection and is trying to do some more exercise.  I will see him back annually at this point since he seems to be doing good.         Wt Readings from Last 3 Encounters:   03/06/24 81.6 kg (180 lb)   09/07/23 78 kg (172 lb)   03/24/23 78.5 kg (173 lb)     Past Medical History:   Diagnosis Date    Anxiety     Arthritis     Enlarged prostate     Essential hypertension     Sleep apnea        Current Outpatient Medications   Medication Sig Dispense Refill    sertraline (ZOLOFT) 100 MG tablet Take 1 tablet by mouth daily      fluorouracil (EFUDEX) 5 % cream Apply topically as needed      tolterodine (DETROL LA) 4 MG extended release capsule Take 1 capsule by mouth daily      lisinopril (PRINIVIL;ZESTRIL) 10 MG tablet Take 1 tablet

## 2024-03-07 ENCOUNTER — HOSPITAL ENCOUNTER (OUTPATIENT)
Facility: HOSPITAL | Age: 69
Setting detail: RECURRING SERIES
Discharge: HOME | End: 2024-03-10
Payer: MEDICARE

## 2024-03-07 PROCEDURE — 97161 PT EVAL LOW COMPLEX 20 MIN: CPT

## 2024-03-07 NOTE — THERAPY EVALUATION
[facility]  [address] [place] [address]  Ph: [phone]     Fax: [phone]         PHYSICAL THERAPY - MEDICARE EVALUATION/PLAN OF CARE NOTE (updated 3/23)      Date of Service: 3/7/2024          Patient Name:  Kit Maier :  1955   Medical   Diagnosis:  Pain in left knee [M25.562]  Osteophyte, left knee [M25.762]  Other specified arthritis, left knee [M13.862] Treatment Diagnosis:  M25.562  LEFT KNEE PAIN    Referral Source:  Esme Dillard,* Provider #:  [id number]                Insurance: Payor: MEDICARE / Plan: MEDICARE PART A AND B / Product Type: *No Product type* /    [x] Patient's date of birth verified      Visit #   Current  / Total 1 1   Time   In / Out 1410 1500   Total Treatment Time 50 minutes   Total Timed Codes 0 minutes   1:1 Treatment Time 50 minutes      [facility] Totals Reminder:  bill using total billable   min of TIMED therapeutic procedures and modalities.   8-22 min = 1 unit; 23-37 min = 2 units; 38-52 min = 3 units;  53-67 min = 4 units; 68-82 min = 5 units       SUBJECTIVE  Pain Level (0-10 scale): 1/10  Changes in pain since onset: Intermittent    Any medication changes, allergies to medications, adverse drug reactions, diagnosis change, or new procedure performed?: [x] No    [] Yes (see summary sheet for update)  Medications: Verified on Patient Summary List    Subjective functional status/changes:     Patient is 68 y.o.  male who presents for physical therapy evaluation for L knee pain.  Pt has been dealing with a lot of L knee pain for some time but is reluctant to get L TKA due to a recent dissatisfaction with a right TKA done in 2023.  He recently had a stem cell injection on 2/3/2024.  He reports his L knee primarily feels weak to him at this time.  He is hoping that therapy will bring him additional strength to help with diminished muscle tone so that he can avoid a potential L TKA because of

## 2024-05-13 ENCOUNTER — HOSPITAL ENCOUNTER (OUTPATIENT)
Facility: HOSPITAL | Age: 69
Discharge: HOME | End: 2024-05-16
Payer: MEDICARE

## 2024-05-13 DIAGNOSIS — M75.42 SUBACROMIAL IMPINGEMENT OF LEFT SHOULDER: ICD-10-CM

## 2024-05-13 PROCEDURE — 73221 MRI JOINT UPR EXTREM W/O DYE: CPT

## 2024-08-07 ENCOUNTER — HOSPITAL ENCOUNTER (OUTPATIENT)
Facility: HOSPITAL | Age: 69
Setting detail: RECURRING SERIES
Discharge: HOME | End: 2024-08-10
Payer: MEDICARE

## 2024-08-07 PROCEDURE — 97110 THERAPEUTIC EXERCISES: CPT

## 2024-08-07 PROCEDURE — 97161 PT EVAL LOW COMPLEX 20 MIN: CPT

## 2024-08-07 NOTE — THERAPY EVALUATION
[facility]  [address] [place] [address]  Ph: [phone]     Fax: [phone]         PHYSICAL THERAPY - MEDICARE EVALUATION/PLAN OF CARE NOTE (updated 3/23)      Date of Service: 2024          Patient Name:  Kit Maier :  1955   Medical   Diagnosis:  Pain in left shoulder [M25.512] Treatment Diagnosis:  M25.512  LEFT SHOULDER PAIN    Referral Source:  Keven Rodriguez MD Provider #:  [id number]                Insurance: Payor: MEDICARE / Plan: MEDICARE PART A AND B / Product Type: *No Product type* /    [x] Patient's date of birth verified      Visit #   Current  / Total 1 10   Time   In / Out 0908 1000   Total Treatment Time 52 minutes   Total Timed Codes 8 minutes   1:1 Treatment Time 8 minutes      [facility] Totals Reminder:  bill using total billable   min of TIMED therapeutic procedures and modalities.   8-22 min = 1 unit; 23-37 min = 2 units; 38-52 min = 3 units;  53-67 min = 4 units; 68-82 min = 5 units       SUBJECTIVE  Pain Level (0-10 scale): 1/10  Changes in pain since onset: Intermittent; movement dependent    Any medication changes, allergies to medications, adverse drug reactions, diagnosis change, or new procedure performed?: [x] No    [] Yes (see summary sheet for update)  Medications: Verified on Patient Summary List    Subjective functional status/changes:     Patient is 68 y.o.  male who presents for physical therapy evaluation with L shoulder pain. He reports that the pain started back in 2024 and has gradually gotten worse. He has had an injection which did not help at all. He has also had a stem cell injection about 6 weeks ago which helped mildly. He reports that he has most issue when he is holding weighted objects out in front of him. 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     Onset Date: 2024  Start of Care: 2024  PLOF: Independent with  Name band;

## 2024-08-09 ENCOUNTER — HOSPITAL ENCOUNTER (OUTPATIENT)
Facility: HOSPITAL | Age: 69
Setting detail: RECURRING SERIES
Discharge: HOME | End: 2024-08-12
Payer: MEDICARE

## 2024-08-09 ENCOUNTER — TELEMEDICINE (OUTPATIENT)
Age: 69
End: 2024-08-09

## 2024-08-09 ENCOUNTER — TELEPHONE (OUTPATIENT)
Age: 69
End: 2024-08-09

## 2024-08-09 ENCOUNTER — CLINICAL DOCUMENTATION (OUTPATIENT)
Age: 69
End: 2024-08-09

## 2024-08-09 DIAGNOSIS — I10 PRIMARY HYPERTENSION: ICD-10-CM

## 2024-08-09 DIAGNOSIS — G47.33 OSA (OBSTRUCTIVE SLEEP APNEA): Primary | ICD-10-CM

## 2024-08-09 PROCEDURE — 97110 THERAPEUTIC EXERCISES: CPT

## 2024-08-09 PROCEDURE — 97150 GROUP THERAPEUTIC PROCEDURES: CPT

## 2024-08-09 PROCEDURE — 97016 VASOPNEUMATIC DEVICE THERAPY: CPT

## 2024-08-09 ASSESSMENT — SLEEP AND FATIGUE QUESTIONNAIRES
ESS TOTAL SCORE: 5
HOW LIKELY ARE YOU TO NOD OFF OR FALL ASLEEP WHEN YOU ARE A PASSENGER IN A CAR FOR AN HOUR WITHOUT A BREAK: WOULD NEVER DOZE
HOW LIKELY ARE YOU TO NOD OFF OR FALL ASLEEP WHILE SITTING AND TALKING TO SOMEONE: WOULD NEVER DOZE
HOW LIKELY ARE YOU TO NOD OFF OR FALL ASLEEP WHILE SITTING QUIETLY AFTER LUNCH WITHOUT ALCOHOL: MODERATE CHANCE OF DOZING
HOW LIKELY ARE YOU TO NOD OFF OR FALL ASLEEP WHILE LYING DOWN TO REST IN THE AFTERNOON WHEN CIRCUMSTANCES PERMIT: MODERATE CHANCE OF DOZING
HOW LIKELY ARE YOU TO NOD OFF OR FALL ASLEEP WHILE WATCHING TV: SLIGHT CHANCE OF DOZING
HOW LIKELY ARE YOU TO NOD OFF OR FALL ASLEEP WHILE SITTING AND READING: WOULD NEVER DOZE
HOW LIKELY ARE YOU TO NOD OFF OR FALL ASLEEP WHILE SITTING INACTIVE IN A PUBLIC PLACE: WOULD NEVER DOZE
HOW LIKELY ARE YOU TO NOD OFF OR FALL ASLEEP IN A CAR, WHILE STOPPED FOR A FEW MINUTES IN TRAFFIC: WOULD NEVER DOZE

## 2024-08-09 NOTE — PATIENT INSTRUCTIONS
drowsiness. Medications that impair a drivers attention should have a warning label. Alcohol naturally makes you sleepy and on its own can cause accidents. Combined with excessive drowsiness its effects are amplified.   Signs of Drowsy Driving:   * You don't remember driving the last few miles   * You may drift out of your ajay   * You are unable to focus and your thoughts wander   * You may yawn more often than normal   * You have difficulty keeping your eyes open / nodding off   * Missing traffic signs, speeding, or tailgating  Prevention-   Good sleep hygiene, lifestyle and behavioral choices have the most impact on drowsy driving. There is no substitute for sleep and the average person requires 8 hours nightly. If you find yourself driving drowsy, stop and sleep. Consider the sleep hygiene tips provided during your visit as well.     Medication Refill Policy: Refills for all medications require 1 week advance notice. Please have your pharmacy fax a refill request. We are unable to fax, or call in \"controled substance\" medications and you will need to pick these prescriptions up from our office.

## 2024-08-09 NOTE — PROGRESS NOTES
[address]   [place] [address]   Tel.  [phone]   Fax. [phone]  [address]   [place] [address]   Tel.  [phone]   Fax. [phone] [address] [address]   Tel.  [phone]   Fax. [phone]     Kit Maier (: 1955) is a 68 y.o. male, established patient, seen for positive airway pressure follow-up, he was last seen by Dr. Valentine on 2024, prior notes reviewed in detail. Polysomnogram (PSG) performed on 23 was indicative of an average AHI of 7.8 per hour with an SpO2 avel of 88%, the duration of SpO2 <88% was 0.0 minutes. He is seen today for follow up.     ASSESSMENT/PLAN:   Diagnosis Orders   1. CHERELLE (obstructive sleep apnea)  DME Order for (Specify) as OP      2. Primary hypertension        3. BMI 23.0-23.9, adult          AHI = 7.8 ().  On Resmed :  Max IPAP 11 cmH2O; Min EPAP 7 cmH2O; PS 4 cmH2O. Set up 2023.    He is adherent with PAP therapy and PAP continues to benefit patient and remains necessary for control of his sleep apnea.     No follow-up provider specified.    Sleep Apnea -  Continue on current pressures.     Orders Placed This Encounter   Procedures    DME Order for (Specify) as OP     Diagnosis: (G47.33) CHERELLE (obstructive sleep apnea)  (primary encounter diagnosis)     Replacement Supplies for Positive Airway Pressure Therapy Device:   Duration of need: 99 months.      Nasal Pillows Combo Mask (Replace) 2 per month.   Nasal Pillows (Replace) 2 per month.    Nasal Cushion (Replace) 2 per month.   Nasal Interface Mask 1 every 3 months.       Headgear 1 every 6 months.   Positive Airway Pressure chinstrap 1 every 6 months.     Tubing with heating element 1 every 3 months.     Filter(s) Disposable 2 per month.   Filter(s) Non-Disposable 1 every 6 months.   .    Water Chamber for Humidifier (Replace) 1 every 6 months.    Nevaeh

## 2024-08-09 NOTE — PROGRESS NOTES
No     2.  Patient will be able to sleep on the L shoulder without pain.   Status at last Eval/Progress Note: pain sometimes  Current Status: not sleeping on L side most of the time  Goal Met?  No     3. Patient will improve shoulder ER to 90 deg to be able to reach behind him better.   Status at last Eval/Progress Note: 85 deg  Current Status: 85 deg  Goal Met?  No    []  See Progress Note/Recertification  []  See Discharge Summary    PLAN  [x]  Continue plan of care  [x]  Upgrade activities as tolerated  []  Discharge due to :  []  Other:      Carri Barkley, PT, DPT       8/9/2024       1:22 PM

## 2024-08-09 NOTE — TELEPHONE ENCOUNTER
Called patient's mobile number left a voice message to pre-register for Telemed visit, complete mychart questionnaires and verify virtual link.    Called patient's home number left a message with patient's spouse to call us back to pre-register for telemed visit.

## 2024-08-13 ENCOUNTER — HOSPITAL ENCOUNTER (OUTPATIENT)
Facility: HOSPITAL | Age: 69
Setting detail: RECURRING SERIES
Discharge: HOME | End: 2024-08-16
Payer: MEDICARE

## 2024-08-13 PROCEDURE — 97110 THERAPEUTIC EXERCISES: CPT

## 2024-08-13 PROCEDURE — 97016 VASOPNEUMATIC DEVICE THERAPY: CPT

## 2024-08-13 NOTE — PROGRESS NOTES
able to sleep on the L shoulder without pain.   Status at last Eval/Progress Note: pain sometimes  Current Status: not sleeping on L side most of the time  Goal Met?  No     3. Patient will improve shoulder ER to 90 deg to be able to reach behind him better.   Status at last Eval/Progress Note: 85 deg  Current Status: 85 deg  Goal Met?  No    []  See Progress Note/Recertification  []  See Discharge Summary    PLAN  [x]  Continue plan of care  [x]  Upgrade activities as tolerated  []  Discharge due to :  []  Other:      Carri Barkley, PT, DPT       8/13/2024       8:21 AM

## 2024-08-15 ENCOUNTER — HOSPITAL ENCOUNTER (OUTPATIENT)
Facility: HOSPITAL | Age: 69
Setting detail: RECURRING SERIES
Discharge: HOME | End: 2024-08-18
Payer: MEDICARE

## 2024-08-15 PROCEDURE — 97016 VASOPNEUMATIC DEVICE THERAPY: CPT

## 2024-08-15 PROCEDURE — 97150 GROUP THERAPEUTIC PROCEDURES: CPT

## 2024-08-15 NOTE — PROGRESS NOTES
PHYSICAL THERAPY - MEDICARE DAILY TREATMENT NOTE (updated 3/23)    Date of Service: 8/15/2024        Patient Name:  Kit Maier :  1955   Medical   Diagnosis:  Pain in left shoulder [M25.512] Treatment Diagnosis:  M25.512  LEFT SHOULDER PAIN    Referral Source:  Keven Rodriguez MD Insurance:   Payor: MEDICARE / Plan: MEDICARE PART A AND B / Product Type: *No Product type* /    [x] Patient's date of birth verified                      Visit #   Current  / Total 4 10   Time   In / Out 0830 0928   Total Treatment Time (in minutes) 58    Total Timed Codes (in minutes) 0    1:1 Treatment Time (in minutes) 0       [facility] Totals Reminder:  bill using total billable   min of TIMED therapeutic procedures and modalities.   8-22 min = 1 unit; 23-37 min = 2 units; 38-52 min = 3 units; 53-67 min = 4 units; 68-82 min = 5 units        SUBJECTIVE  Pain Level (0-10 scale): 1/10    Any medication changes, allergies to medications, adverse drug reactions, diagnosis change, or new procedure performed?: No  Medications: [x] Verified on Patient Summary List    Subjective functional status/changes:     \"I am doing pretty good this morning.\"    OBJECTIVE  Therapeutic Procedures:  Tx Min Billable or 1:1 Min (if diff from Tx Min) Procedure, Rationale, Specifics   48  71958 Group Therapy (untimed): Billed while completing therapeutic exercise during middle of treatment session to improve patient's ability to progress to PLOF and address remaining functional goals.  (see flow sheet as applicable)   48     Total Total   [x] Patient Education billed concurrently with other procedures     Modalities Rationale:     decrease edema, decrease inflammation, and decrease pain to improve patient's ability to progress to PLOF and address remaining functional goals.       min [] Estim Unattended,             []  NMES  [] PreMod       []  IFC  [] Other:  []w/US   []w/ice   []w/heat  Position:  Location:            min []  Mechanical

## 2024-08-20 ENCOUNTER — HOSPITAL ENCOUNTER (OUTPATIENT)
Facility: HOSPITAL | Age: 69
Setting detail: RECURRING SERIES
Discharge: HOME | End: 2024-08-23
Payer: MEDICARE

## 2024-08-20 PROCEDURE — 97150 GROUP THERAPEUTIC PROCEDURES: CPT

## 2024-08-20 PROCEDURE — 97016 VASOPNEUMATIC DEVICE THERAPY: CPT

## 2024-08-20 PROCEDURE — 97110 THERAPEUTIC EXERCISES: CPT

## 2024-08-20 NOTE — PROGRESS NOTES
PHYSICAL THERAPY - MEDICARE DAILY TREATMENT NOTE (updated 3/23)    Date of Service: 2024        Patient Name:  Kit Maier :  1955   Medical   Diagnosis:  Pain in left shoulder [M25.512] Treatment Diagnosis:  M25.512  LEFT SHOULDER PAIN    Referral Source:  Keven Rodriguez MD Insurance:   Payor: MEDICARE / Plan: MEDICARE PART A AND B / Product Type: *No Product type* /    [x] Patient's date of birth verified                      Visit #   Current  / Total 5 10   Time   In / Out 833 am 940 am   Total Treatment Time (in minutes) 67    Total Timed Codes (in minutes) 57    1:1 Treatment Time (in minutes) 30       [facility] Totals Reminder:  bill using total billable   min of TIMED therapeutic procedures and modalities.   8-22 min = 1 unit; 23-37 min = 2 units; 38-52 min = 3 units; 53-67 min = 4 units; 68-82 min = 5 units        SUBJECTIVE  Pain Level (0-10 scale): 1/10    Any medication changes, allergies to medications, adverse drug reactions, diagnosis change, or new procedure performed?: No  Medications: [x] Verified on Patient Summary List    Subjective functional status/changes:     \"Pt reports not to bad this am.\"    OBJECTIVE  Therapeutic Procedures:  Tx Min Billable or 1:1 Min (if diff from Tx Min) Procedure, Rationale, Specifics   30 30 [address]7590 Therapeutic Exercise (timed):  increase ROM, strength, coordination, balance, and proprioception to improve patient's ability to progress to PLOF and address remaining functional goals. (see flow sheet as applicable)   27 0 52465 Group Therapy (untimed): Billed while completing therapeutic exercise during beginning of treatment session to improve patient's ability to progress to PLOF and address remaining functional goals.  (see flow sheet as applicable)   57 30    Total Total   [x] Patient Education billed concurrently with other procedures     Modalities Rationale:     decrease edema, decrease inflammation, decrease pain, and increase tissue

## 2024-08-22 ENCOUNTER — HOSPITAL ENCOUNTER (OUTPATIENT)
Facility: HOSPITAL | Age: 69
Setting detail: RECURRING SERIES
Discharge: HOME | End: 2024-08-25
Payer: MEDICARE

## 2024-08-22 PROCEDURE — 97150 GROUP THERAPEUTIC PROCEDURES: CPT

## 2024-08-22 PROCEDURE — 97110 THERAPEUTIC EXERCISES: CPT

## 2024-08-22 PROCEDURE — 97016 VASOPNEUMATIC DEVICE THERAPY: CPT

## 2024-08-22 NOTE — PROGRESS NOTES
Exercise Flow Sheet:                                                                                    Running Visit #    EXERCISE   1   2   3   4   5   6   7   8   9   10      UBE          lvl 1 x12 min    lvl 2 x12 min  lvl 2 x12 min    lvl 2.5 hills x12 min   Lvl 2 x 12min                Scap Retrac and Shoulder Rolls    X10 ea    X20 ea     X20 ea  X20 ea   Rolls x 20 with cues  Rolls x20               Doorway Stretch- low and 90    X30 sec ea          3x30 sec ea 3x30 sec ea  3x30 sec ea   3x30 sec ea                TB Ext    RTB x10    RTB x20     RTB x20  RTB x20   RTB x20  RTB x20                TB Rectrac    RTB x10    RTB x20      RTB x20  RTB x20  RTB x20  RTB x20                TB ER&IR         RTB x20 ea     RTB x20 ea  RTB x20 ea   RTB x20 ea    RTB x20 ea              Weighted flex/scap/abd         1# x10 ea  Flex/scap  flex/scap/abd  X10 ea no wt  lang/scap/abd  X10 ea no wt      flex/scap/abd  X10 ea no wt  Add weight             Prone Rows                                Finger Ladder          Flex/abd x10 ea   Flex/abd x10 ea   Flex/abd x10 ea   wall rolls with ball x 3'  wall rolls with ball x 3'               Pulleys- F/AB              3 min ea  3 min ea  3 min ea  3 min ea  + IR              Modalities to be included future treatment sessions: Vasopneumatic Compression, Electrical Stimulation, and Heat Pack  Has HEP been provided to patient? [] No    [x] Yes                                      Access Code:    [email or web address]                  Date Provided:08/07/2024  [] Reviewed HEP    [] Progressed/Changed HEP, details:         GOALS  Short Term Goals, to be met within 5 treatments:  Patient will demonstrate independence and compliance with HEP in order to increase mobility and assist with carryover from PT services.  Status at last Eval/Progress Note: provided  Current Status: performing  Goal Met?  Yes     2.  Patient will be able to carry in the groceries with less than 3/10 pain in the shoulder.    Status at last Eval/Progress Note: gets twinges of high pain  Current Status: twinges of pain with groceries  Goal Met?  No     3. Patient will be able to work the garden with hoe/rake with 4+/5 strength.   Status at last Eval/Progress Note: 4-/5 weakest  Current Status: 4-/5  Goal Met?  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   Status at last Eval/Progress Note: 4-/5 strength  Current Status: 4-/5 strength  Goal Met?  No     2.  Patient will be able to sleep on the L shoulder without pain.   Status at last Eval/Progress Note: pain sometimes  Current Status: not sleeping on L side most of the time  Goal Met?  No     3. Patient will improve shoulder ER to 90 deg to be able to reach behind him better.   Status at last Eval/Progress Note: 85 deg  Current Status: 85 deg  Goal Met?  No    []  See Progress Note/Recertification  []  See Discharge Summary    PLAN  [x]  Continue plan of care  [x]  Upgrade activities as tolerated  []  Discharge due to :  []  Other:      Carri Barkley, PT, DPT       8/22/2024       2:05 PM

## 2024-08-27 ENCOUNTER — HOSPITAL ENCOUNTER (OUTPATIENT)
Facility: HOSPITAL | Age: 69
Setting detail: RECURRING SERIES
Discharge: HOME | End: 2024-08-30
Payer: MEDICARE

## 2024-08-27 PROCEDURE — 97016 VASOPNEUMATIC DEVICE THERAPY: CPT

## 2024-08-27 PROCEDURE — 97110 THERAPEUTIC EXERCISES: CPT

## 2024-08-27 NOTE — PROGRESS NOTES
PHYSICAL THERAPY - MEDICARE DAILY TREATMENT NOTE (updated 3/23)    Date of Service: 2024        Patient Name:  Kit Maier :  1955   Medical   Diagnosis:  Pain in left shoulder [M25.512] Treatment Diagnosis:  M25.512  LEFT SHOULDER PAIN    Referral Source:  Keven Rodriguez MD Insurance:   Payor: MEDICARE / Plan: MEDICARE PART A AND B / Product Type: *No Product type* /    [x] Patient's date of birth verified                      Visit #   Current  / Total 7 10   Time   In / Out 0805 0907   Total Treatment Time (in minutes) 62   Total Timed Codes (in minutes) 52   1:1 Treatment Time (in minutes) 10      [facility] Totals Reminder:  bill using total billable   min of TIMED therapeutic procedures and modalities.   8-22 min = 1 unit; 23-37 min = 2 units; 38-52 min = 3 units; 53-67 min = 4 units; 68-82 min = 5 units        SUBJECTIVE  Pain Level (0-10 scale): 2/10    Any medication changes, allergies to medications, adverse drug reactions, diagnosis change, or new procedure performed?: No  Medications: [x] Verified on Patient Summary List    Subjective functional status/changes:     \"I think this is helping.\"    OBJECTIVE  Therapeutic Procedures:  Tx Min Billable or 1:1 Min (if diff from Tx Min) Procedure, Rationale, Specifics   52  57706 Therapeutic Exercise (timed):  increase ROM, strength, coordination, balance, and proprioception to improve patient's ability to progress to PLOF and address remaining functional goals. (see flow sheet as applicable)     01255 Group Therapy (untimed): Billed while completing therapeutic exercise during beginning of treatment session to improve patient's ability to progress to PLOF and address remaining functional goals.  (see flow sheet as applicable)   52     Total Total   [x] Patient Education billed concurrently with other procedures     Modalities Rationale:     decrease edema, decrease inflammation, decrease pain, and increase tissue extensibility to improve  services.  Status at last Eval/Progress Note: provided  Current Status: performing  Goal Met?  Yes     2.  Patient will be able to carry in the groceries with less than 3/10 pain in the shoulder.   Status at last Eval/Progress Note: gets twinges of high pain  Current Status: twinges of pain with groceries  Goal Met?  No     3. Patient will be able to work the garden with hoe/rake with 4+/5 strength.   Status at last Eval/Progress Note: 4-/5 weakest  Current Status: 4-/5  Goal Met?  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   Status at last Eval/Progress Note: 4-/5 strength  Current Status: 4-/5 strength  Goal Met?  No     2.  Patient will be able to sleep on the L shoulder without pain.   Status at last Eval/Progress Note: pain sometimes  Current Status: not sleeping on L side most of the time  Goal Met?  No     3. Patient will improve shoulder ER to 90 deg to be able to reach behind him better.   Status at last Eval/Progress Note: 85 deg  Current Status: 85 deg  Goal Met?  No    []  See Progress Note/Recertification  []  See Discharge Summary    PLAN  [x]  Continue plan of care  [x]  Upgrade activities as tolerated  []  Discharge due to :  []  Other:      Felisha Enciso, PT, DPT       8/27/2024       8:07 AM

## 2024-08-29 ENCOUNTER — HOSPITAL ENCOUNTER (OUTPATIENT)
Facility: HOSPITAL | Age: 69
Setting detail: RECURRING SERIES
End: 2024-08-29
Payer: MEDICARE

## 2024-08-29 PROCEDURE — 97150 GROUP THERAPEUTIC PROCEDURES: CPT

## 2024-08-29 PROCEDURE — 97016 VASOPNEUMATIC DEVICE THERAPY: CPT

## 2024-08-29 PROCEDURE — 97110 THERAPEUTIC EXERCISES: CPT

## 2024-08-29 NOTE — PROGRESS NOTES
PHYSICAL THERAPY - MEDICARE DAILY TREATMENT NOTE (updated 3/23)    Date of Service: 2024        Patient Name:  Kit Maier :  1955   Medical   Diagnosis:  Pain in left shoulder [M25.512] Treatment Diagnosis:  M25.512  LEFT SHOULDER PAIN    Referral Source:  Keven Rodriguez MD Insurance:   Payor: MEDICARE / Plan: MEDICARE PART A AND B / Product Type: *No Product type* /    [x] Patient's date of birth verified                      Visit #   Current  / Total 8 10   Time   In / Out 0755 0900   Total Treatment Time (in minutes) 65   Total Timed Codes (in minutes) 43   1:1 Treatment Time (in minutes) 43      [facility] Totals Reminder:  bill using total billable   min of TIMED therapeutic procedures and modalities.   8-22 min = 1 unit; 23-37 min = 2 units; 38-52 min = 3 units; 53-67 min = 4 units; 68-82 min = 5 units        SUBJECTIVE  Pain Level (0-10 scale): 0/10    Any medication changes, allergies to medications, adverse drug reactions, diagnosis change, or new procedure performed?: No  Medications: [x] Verified on Patient Summary List    Subjective functional status/changes:     \"I am doing good so far this morning.\"    OBJECTIVE  Therapeutic Procedures:  Tx Min Billable or 1:1 Min (if diff from Tx Min) Procedure, Rationale, Specifics   43 43 [id number] Therapeutic Exercise (timed):  increase ROM, strength, coordination, balance, and proprioception to improve patient's ability to progress to PLOF and address remaining functional goals. (see flow sheet as applicable)   12 0 95443 Group Therapy (untimed): Billed while completing therapeutic exercise during beginning of treatment session to improve patient's ability to progress to PLOF and address remaining functional goals.  (see flow sheet as applicable)   55 43    Total Total   [x] Patient Education billed concurrently with other procedures     Modalities Rationale:     decrease edema, decrease inflammation, decrease pain, and increase tissue

## 2024-09-03 ENCOUNTER — HOSPITAL ENCOUNTER (OUTPATIENT)
Facility: HOSPITAL | Age: 69
Setting detail: RECURRING SERIES
Discharge: HOME | End: 2024-09-06
Payer: MEDICARE

## 2024-09-03 PROCEDURE — 97016 VASOPNEUMATIC DEVICE THERAPY: CPT

## 2024-09-03 PROCEDURE — 97150 GROUP THERAPEUTIC PROCEDURES: CPT

## 2024-09-03 PROCEDURE — 97110 THERAPEUTIC EXERCISES: CPT

## 2024-09-03 NOTE — PROGRESS NOTES
PHYSICAL THERAPY - DAILY TREATMENT NOTE (updated 3/23)    Date of Service: 9/3/2024        Patient Name:  Kit Maier :  1955   Medical   Diagnosis:  Pain in left shoulder [M25.512] Treatment Diagnosis:  M25.512  LEFT SHOULDER PAIN    Referral Source:  Keven Rodriguez MD Insurance:   Payor: MEDICARE / Plan: MEDICARE PART A AND B / Product Type: *No Product type* /     [x] Patient's date of birth verified                Visit #   Current  / Total 9 10   Time   In / Out 0830 0925   Total Treatment Time (in minutes) 55    Total Timed Codes  (in minutes) 30   [facility] Totals Reminder:    8-22 min = 1 unit; 23-37 min = 2 units; 38-52 min = 3 units;  53-67 min = 4 units; 68-82 min = 5 units      SUBJECTIVE  Pain Level (0-10 scale): /10    Any medication changes, allergies to medications, adverse drug reactions, diagnosis change, or new procedure performed?: No  Medications: [x]  Verified on Patient Summary List    Subjective functional status/changes:     \"I am doing well. I have continued with my gardening.\"    OBJECTIVE  Therapeutic Procedures:  Tx Min Billable or 1:1 Min (if diff from Tx Min) Procedure, Rationale, Specifics   30 30 67140 Therapeutic Exercise (timed):  increase ROM, strength, coordination, balance, and proprioception to improve patient's ability to progress to PLOF and address remaining functional goals. (see flow sheet as applicable)   15 15 78437 Group Therapy (untimed): Billed while completing therapeutic exercise during middle of treatment session to improve patient's ability to progress to PLOF and address remaining functional goals.  (see flow sheet as applicable)             45 45    Total Total   [x] Patient Education billed concurrently with other procedures    Modalities Rationale:     decrease inflammation to improve patient's ability to progress to PLOF and address remaining functional goals.       min [] Estim Unattended,             []  NMES  [] PreMod       []  IFC  []

## 2024-09-05 ENCOUNTER — HOSPITAL ENCOUNTER (OUTPATIENT)
Facility: HOSPITAL | Age: 69
Setting detail: RECURRING SERIES
Discharge: HOME | End: 2024-09-08
Payer: MEDICARE

## 2024-09-05 PROCEDURE — 97110 THERAPEUTIC EXERCISES: CPT

## 2024-09-05 PROCEDURE — 97016 VASOPNEUMATIC DEVICE THERAPY: CPT

## 2024-09-05 PROCEDURE — 97150 GROUP THERAPEUTIC PROCEDURES: CPT

## 2024-09-10 ENCOUNTER — HOSPITAL ENCOUNTER (OUTPATIENT)
Facility: HOSPITAL | Age: 69
Setting detail: RECURRING SERIES
Discharge: HOME | End: 2024-09-13
Payer: MEDICARE

## 2024-09-10 PROCEDURE — 97016 VASOPNEUMATIC DEVICE THERAPY: CPT

## 2024-09-10 PROCEDURE — 97110 THERAPEUTIC EXERCISES: CPT

## 2024-09-12 ENCOUNTER — HOSPITAL ENCOUNTER (OUTPATIENT)
Facility: HOSPITAL | Age: 69
Setting detail: RECURRING SERIES
Discharge: HOME | End: 2024-09-15
Payer: MEDICARE

## 2024-09-12 PROCEDURE — 97110 THERAPEUTIC EXERCISES: CPT

## 2024-09-12 PROCEDURE — 97016 VASOPNEUMATIC DEVICE THERAPY: CPT

## 2024-09-12 PROCEDURE — 97150 GROUP THERAPEUTIC PROCEDURES: CPT

## 2024-09-16 ENCOUNTER — HOSPITAL ENCOUNTER (OUTPATIENT)
Facility: HOSPITAL | Age: 69
Setting detail: RECURRING SERIES
Discharge: HOME | End: 2024-09-19
Payer: MEDICARE

## 2024-09-16 PROCEDURE — 97016 VASOPNEUMATIC DEVICE THERAPY: CPT

## 2024-09-16 PROCEDURE — 97110 THERAPEUTIC EXERCISES: CPT

## 2024-09-17 ENCOUNTER — APPOINTMENT (OUTPATIENT)
Facility: HOSPITAL | Age: 69
End: 2024-09-17
Payer: MEDICARE

## 2024-09-19 ENCOUNTER — HOSPITAL ENCOUNTER (OUTPATIENT)
Facility: HOSPITAL | Age: 69
Setting detail: RECURRING SERIES
Discharge: HOME | End: 2024-09-22
Payer: MEDICARE

## 2024-09-19 PROCEDURE — 97150 GROUP THERAPEUTIC PROCEDURES: CPT

## 2024-09-19 PROCEDURE — 97110 THERAPEUTIC EXERCISES: CPT

## 2025-03-28 ENCOUNTER — OFFICE VISIT (OUTPATIENT)
Age: 70
End: 2025-03-28
Payer: MEDICARE

## 2025-03-28 VITALS
WEIGHT: 176 LBS | BODY MASS INDEX: 23.84 KG/M2 | HEART RATE: 63 BPM | OXYGEN SATURATION: 96 % | DIASTOLIC BLOOD PRESSURE: 70 MMHG | HEIGHT: 72 IN | SYSTOLIC BLOOD PRESSURE: 128 MMHG

## 2025-03-28 DIAGNOSIS — Z01.810 PREOP CARDIOVASCULAR EXAM: Primary | ICD-10-CM

## 2025-03-28 DIAGNOSIS — R00.1 BRADYCARDIA: ICD-10-CM

## 2025-03-28 DIAGNOSIS — I10 PRIMARY HYPERTENSION: ICD-10-CM

## 2025-03-28 DIAGNOSIS — G47.33 OSA (OBSTRUCTIVE SLEEP APNEA): ICD-10-CM

## 2025-03-28 PROCEDURE — G8420 CALC BMI NORM PARAMETERS: HCPCS | Performed by: INTERNAL MEDICINE

## 2025-03-28 PROCEDURE — 3017F COLORECTAL CA SCREEN DOC REV: CPT | Performed by: INTERNAL MEDICINE

## 2025-03-28 PROCEDURE — 3074F SYST BP LT 130 MM HG: CPT | Performed by: INTERNAL MEDICINE

## 2025-03-28 PROCEDURE — 1123F ACP DISCUSS/DSCN MKR DOCD: CPT | Performed by: INTERNAL MEDICINE

## 2025-03-28 PROCEDURE — 99214 OFFICE O/P EST MOD 30 MIN: CPT | Performed by: INTERNAL MEDICINE

## 2025-03-28 PROCEDURE — 1036F TOBACCO NON-USER: CPT | Performed by: INTERNAL MEDICINE

## 2025-03-28 PROCEDURE — 1126F AMNT PAIN NOTED NONE PRSNT: CPT | Performed by: INTERNAL MEDICINE

## 2025-03-28 PROCEDURE — G8428 CUR MEDS NOT DOCUMENT: HCPCS | Performed by: INTERNAL MEDICINE

## 2025-03-28 PROCEDURE — 3078F DIAST BP <80 MM HG: CPT | Performed by: INTERNAL MEDICINE

## 2025-03-28 NOTE — PROGRESS NOTES
Dictation on: 03/28/2025 10:15 AM by: ELSIE TAFOYA [[id number]]     Wt Readings from Last 3 Encounters:   03/28/25 79.8 kg (176 lb)   05/13/24 79.4 kg (175 lb)   03/06/24 81.6 kg (180 lb)     Past Medical History:   Diagnosis Date    Anxiety     Arthritis     Enlarged prostate     Essential hypertension     Sleep apnea        Current Outpatient Medications   Medication Sig Dispense Refill    sertraline (ZOLOFT) 100 MG tablet Take 1 tablet by mouth daily      fluorouracil (EFUDEX) 5 % cream Apply topically as needed      tolterodine (DETROL LA) 4 MG extended release capsule Take 1 capsule by mouth daily      lisinopril (PRINIVIL;ZESTRIL) 10 MG tablet Take 1 tablet by mouth daily      omeprazole (PRILOSEC) 20 MG delayed release capsule Take 1 capsule by mouth daily       No current facility-administered medications for this visit.       Social History   reports that he has quit smoking. He has quit using smokeless tobacco.   reports current alcohol use of about 1.0 standard drink of alcohol per week.    Family History  family history includes Diabetes in his father; Heart Surgery in his father; Lymphoma in his mother.    Review of Systems  Except as stated above include:  Constitutional: Negative for fever, chills and malaise/fatigue.   HEENT: No congestion or recent URI.  Gastrointestinal: No nausea, vomiting, abdominal pain, bloody stools.  Pulmonary:  Negative except as stated above.  Cardiac:  Negative except as stated above.  Musculoskeletal: Negative except as stated above.  Neurological:  No localized symptoms.  Endocrine:  Negative except as stated above.    PHYSICAL EXAM  BP Readings from Last 3 Encounters:   03/28/25 128/70   03/06/24 130/80   10/19/23 118/71     Pulse Readings from Last 3 Encounters:   03/28/25 63   03/06/24 62   10/19/23 64     General:   Well developed, well groomed.    Head/Neck:   No obvious jugular venous distention     No obvious carotid pulsations.      No evidence of

## 2025-03-28 NOTE — PROGRESS NOTES
HPI:  69 year old male here for preoperative cardiovascular exam. He is planning to get shoulder surgery at [facility] but Dr. Solano. Clinically he is doing well. No new chest pain, dyspnea, PND, orthopnea or edema. No palpitations or syncope. 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     Impression:   Preoperative cardiovascular exam for left shoulder surgery, April 4, 2025 by Dr. Solano, low risk to proceed.   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   Diagnosis of sleep apnea about two years ago, on CPAP.   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  We will continue to monitor. EKG March 10th shows heart rate 62 beats per minute.   Atypical chest pain with stress test and echocardiogram September 2022, low risk with normal EF.   Remote pericardiectomy.   Hypertension, well-controlled on Lisinopril.   GERD.   Degenerative joint disease.     Plan:  Okay to proceed low risk for surgery. 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 He is on lisinopril for hypertension. I'll see back annually.

## 2025-04-07 ENCOUNTER — HOSPITAL ENCOUNTER (OUTPATIENT)
Facility: HOSPITAL | Age: 70
Setting detail: RECURRING SERIES
Discharge: HOME | End: 2025-04-10
Payer: MEDICARE

## 2025-04-07 PROCEDURE — 97110 THERAPEUTIC EXERCISES: CPT

## 2025-04-07 PROCEDURE — 97161 PT EVAL LOW COMPLEX 20 MIN: CPT

## 2025-04-07 NOTE — THERAPY EVALUATION
[facility]  [address] [place] [id number]  Ph: [phone]     Fax: [phone]         PHYSICAL THERAPY - MEDICARE EVALUATION/PLAN OF CARE NOTE (updated 3/23)      Date of Service: 2025          Patient Name:  Kit Maier :  1955   Medical   Diagnosis:  S/P arthroscopy of left shoulder [Z98.890] Treatment Diagnosis:  M25.512  LEFT SHOULDER PAIN    Referral Source:  Reid Solano MD Provider #:  [id number]                Insurance: Payor: MEDICARE / Plan: MEDICARE PART A AND B / Product Type: *No Product type* /    [x] Patient's date of birth verified      Visit #   Current  / Total 1 10   Time   In / Out 1310 1400   Total Treatment Time 50 minutes   Total Timed Codes 10 minutes   1:1 Treatment Time 10 minutes      [facility] Totals Reminder:  bill using total billable   min of TIMED therapeutic procedures and modalities.   8-22 min = 1 unit; 23-37 min = 2 units; 38-52 min = 3 units;  53-67 min = 4 units; 68-82 min = 5 units       SUBJECTIVE  Pain Level (0-10 scale): 2/10  Changes in pain since onset: Intermittent    Any medication changes, allergies to medications, adverse drug reactions, diagnosis change, or new procedure performed?: [x] No    [] Yes (see summary sheet for update)  Medications: Verified on Patient Summary List    Subjective functional status/changes:     Patient is 69 y.o.  male who presents for physical therapy evaluation s/p SAD and bicep tendonesis on 2025. He reports that they were going to do more extensive surgery, but they did only did bicep tendonesis vs messing with RTC. He will return for follow up on 2025. He reports that he is following instructions and wearing sling at the moment.     Onset Date: 2025  Start of Care: 2025  PLOF: Independent with all ADL's   Mechanism of Injury: Surgery  Previous Treatment/Compliance: Medication  Radiographs: None taken  What increases symptoms: movement

## 2025-04-10 ENCOUNTER — HOSPITAL ENCOUNTER (OUTPATIENT)
Facility: HOSPITAL | Age: 70
Setting detail: RECURRING SERIES
Discharge: HOME | End: 2025-04-13
Payer: MEDICARE

## 2025-04-10 PROCEDURE — 97140 MANUAL THERAPY 1/> REGIONS: CPT

## 2025-04-10 PROCEDURE — 97110 THERAPEUTIC EXERCISES: CPT

## 2025-04-10 PROCEDURE — 97016 VASOPNEUMATIC DEVICE THERAPY: CPT

## 2025-04-10 NOTE — PROGRESS NOTES
PHYSICAL THERAPY - MEDICARE DAILY TREATMENT NOTE (updated 3/23)    Date of Service: 4/10/2025        Patient Name:  Kit Maier :  1955   Medical   Diagnosis:  S/P arthroscopy of left shoulder [Z98.890] Treatment Diagnosis:  M25.512  LEFT SHOULDER PAIN    Referral Source:  Reid Solano MD Insurance:   Payor: MEDICARE / Plan: MEDICARE PART A AND B / Product Type: *No Product type* /    [x] Patient's date of birth verified                      Visit #   Current  / Total 2 10   Time   In / Out 1105 1205   Total Treatment Time (in minutes) 60    Total Timed Codes (in minutes) 50    1:1 Treatment Time (in minutes) 50       [facility] Totals Reminder:  bill using total billable   min of TIMED therapeutic procedures and modalities.   8-22 min = 1 unit; 23-37 min = 2 units; 38-52 min = 3 units; 53-67 min = 4 units; 68-82 min = 5 units        SUBJECTIVE  Pain Level (0-10 scale): 2/10    Any medication changes, allergies to medications, adverse drug reactions, diagnosis change, or new procedure performed?: No  Medications: [x] Verified on Patient Summary List    Subjective functional status/changes:     \"I am doing good.\"    OBJECTIVE  Therapeutic Procedures:  Tx Min Billable or 1:1 Min (if diff from Tx Min) Procedure, Rationale, Specifics   10 10 47577 Manual Therapy (timed):  decrease pain, increase ROM, and increase tissue extensibility to improve patient's ability to progress to PLOF and address remaining functional goals.  The manual therapy interventions were performed at a separate and distinct time from the therapeutic activities interventions . (see flow sheet as applicable)    40 40 93030 Therapeutic Exercise (timed):  increase ROM, strength, coordination, balance, and proprioception to improve patient's ability to progress to PLOF and address remaining functional goals. (see flow sheet as applicable)   50 50    Total Total   [x] Patient Education billed concurrently with other procedures

## 2025-04-14 ENCOUNTER — HOSPITAL ENCOUNTER (OUTPATIENT)
Facility: HOSPITAL | Age: 70
Setting detail: RECURRING SERIES
Discharge: HOME | End: 2025-04-17
Payer: MEDICARE

## 2025-04-14 PROCEDURE — 97150 GROUP THERAPEUTIC PROCEDURES: CPT

## 2025-04-14 PROCEDURE — 97110 THERAPEUTIC EXERCISES: CPT

## 2025-04-14 PROCEDURE — 97016 VASOPNEUMATIC DEVICE THERAPY: CPT

## 2025-04-14 NOTE — PROGRESS NOTES
Running Visit #    EXERCISE   1   2   3   4   5   6   7   8   9   10      PROM          10 min     8 min                    Table Slides- Flex/ABD/Elbow X8 ea     4 min ea    4 min ea                     Scap Retrac    x10    x30     x30                      Shoulder Rolls and Shrugs    x10     x30 ea     x30 ea                     UT stretch    X30 sec B    3x30 sec B      3x30 sec B                     Pulleys- Flex/Abd         3 min ea     3 min ea                     Pendulum             X2 min eaflex/abd                   Bicep Curls- 3 Way            supine x20 ea                                                                                Modalities to be included future treatment sessions: Vasopneumatic Compression  Has HEP been provided to patient? [] No    [x] Yes                                      Access Code:                    Date Provided: 04/07/2025  [] Reviewed HEP    [] Progressed/Changed HEP, details:         GOALS  Short Term Goals, to be met within 10 treatments:  Patient will demonstrate independence and compliance with HEP in order to increase mobility and assist with carryover from PT services.  Status at last Eval/Progress Note: provided  Current Status: performing  Goal Met?  In Progress     2.  Patient will be able to have > 150 deg of PROM abduction and flexion to demonstrate progress within protocol.   Status at last Eval/Progress Note: 118 flex 60 abd  Current Status: 118 flex 60 abd  Goal Met?  No     3. Patient will have 4-/5 strength to be able to  cup of liquid or light plate of food.   Status at last Eval/Progress Note: NT due to protocol   Current Status: NT due to protocol   Goal Met?  No        Long Term Goals, to be met within 20 treatments:  1.Patient will have 150 deg of shoulder flexion and abduction to be able to perform self grooming tasks without modifications.   Status at last Eval/Progress Note:

## 2025-04-16 ENCOUNTER — HOSPITAL ENCOUNTER (OUTPATIENT)
Facility: HOSPITAL | Age: 70
Setting detail: RECURRING SERIES
Discharge: HOME | End: 2025-04-19
Payer: MEDICARE

## 2025-04-16 PROCEDURE — 97110 THERAPEUTIC EXERCISES: CPT

## 2025-04-16 PROCEDURE — 97016 VASOPNEUMATIC DEVICE THERAPY: CPT

## 2025-04-16 PROCEDURE — 97150 GROUP THERAPEUTIC PROCEDURES: CPT

## 2025-04-16 NOTE — PROGRESS NOTES
PHYSICAL THERAPY - MEDICARE DAILY TREATMENT NOTE (updated 3/23)    Date of Service: 2025        Patient Name:  Kit Maier :  1955   Medical   Diagnosis:  S/P arthroscopy of left shoulder [Z98.890] Treatment Diagnosis:  M25.512  LEFT SHOULDER PAIN    Referral Source:  Reid Solano MD Insurance:   Payor: MEDICARE / Plan: MEDICARE PART A AND B / Product Type: *No Product type* /    [x] Patient's date of birth verified                      Visit #   Current  / Total 4 10   Time   In / Out 0750 0859   Total Treatment Time (in minutes) 69    Total Timed Codes (in minutes) 35    1:1 Treatment Time (in minutes) 35       [facility] Totals Reminder:  bill using total billable   min of TIMED therapeutic procedures and modalities.   8-22 min = 1 unit; 23-37 min = 2 units; 38-52 min = 3 units; 53-67 min = 4 units; 68-82 min = 5 units        SUBJECTIVE  Pain Level (0-10 scale): 2/10    Any medication changes, allergies to medications, adverse drug reactions, diagnosis change, or new procedure performed?: No  Medications: [x] Verified on Patient Summary List    Subjective functional status/changes:     \"I might have over done it some yesterday, so I am a little sore today.\"    OBJECTIVE  Therapeutic Procedures:  Tx Min Billable or 1:1 Min (if diff from Tx Min) Procedure, Rationale, Specifics   24 0 70238 Group Therapy (untimed): Billed while completing therapeutic exercise during beginning of treatment session to improve patient's ability to progress to PLOF and address remaining functional goals.  (see flow sheet as applicable)    35 35 98228 Therapeutic Exercise (timed):  increase ROM, strength, coordination, balance, and proprioception to improve patient's ability to progress to PLOF and address remaining functional goals. (see flow sheet as applicable)   59 35    Total Total   [x] Patient Education billed concurrently with other procedures     Modalities Rationale:     decrease edema and decrease pain to

## 2025-04-22 ENCOUNTER — HOSPITAL ENCOUNTER (OUTPATIENT)
Facility: HOSPITAL | Age: 70
Setting detail: RECURRING SERIES
Discharge: HOME | End: 2025-04-25
Payer: MEDICARE

## 2025-04-22 PROCEDURE — 97016 VASOPNEUMATIC DEVICE THERAPY: CPT

## 2025-04-22 PROCEDURE — 97140 MANUAL THERAPY 1/> REGIONS: CPT

## 2025-04-22 PROCEDURE — 97110 THERAPEUTIC EXERCISES: CPT

## 2025-04-22 NOTE — PROGRESS NOTES
PHYSICAL THERAPY - MEDICARE DAILY TREATMENT NOTE (updated 3/23)    Date of Service: 2025        Patient Name:  Kit Maier :  1955   Medical   Diagnosis:  S/P arthroscopy of left shoulder [Z98.890] Treatment Diagnosis:  M25.512  LEFT SHOULDER PAIN    Referral Source:  Reid Solano MD Insurance:   Payor: MEDICARE / Plan: MEDICARE PART A AND B / Product Type: *No Product type* /    [x] Patient's date of birth verified                      Visit #   Current  / Total 5 10   Time   In / Out 0800 0900   Total Treatment Time (in minutes) 60    Total Timed Codes (in minutes) 50    1:1 Treatment Time (in minutes) 50       [facility] Totals Reminder:  bill using total billable   min of TIMED therapeutic procedures and modalities.   8-22 min = 1 unit; 23-37 min = 2 units; 38-52 min = 3 units; 53-67 min = 4 units; 68-82 min = 5 units        SUBJECTIVE  Pain Level (0-10 scale): 0/10    Any medication changes, allergies to medications, adverse drug reactions, diagnosis change, or new procedure performed?: No  Medications: [x] Verified on Patient Summary List    Subjective functional status/changes:     \"I had a good doctor's appt.\"    OBJECTIVE  Therapeutic Procedures:  Tx Min Billable or 1:1 Min (if diff from Tx Min) Procedure, Rationale, Specifics   10 10 22760 Manual Therapy (timed):  increase ROM and increase tissue extensibility to improve patient's ability to progress to PLOF and address remaining functional goals.  The manual therapy interventions were performed at a separate and distinct time from the therapeutic activities interventions . (see flow sheet as applicable)    40 40 84443 Therapeutic Exercise (timed):  increase ROM, strength, coordination, balance, and proprioception to improve patient's ability to progress to PLOF and address remaining functional goals. (see flow sheet as applicable)   50 50    Total Total   [x] Patient Education billed concurrently with other procedures     Modalities

## 2025-04-25 ENCOUNTER — HOSPITAL ENCOUNTER (OUTPATIENT)
Facility: HOSPITAL | Age: 70
Setting detail: RECURRING SERIES
Discharge: HOME | End: 2025-04-28
Payer: MEDICARE

## 2025-04-25 PROCEDURE — 97150 GROUP THERAPEUTIC PROCEDURES: CPT

## 2025-04-25 PROCEDURE — 97016 VASOPNEUMATIC DEVICE THERAPY: CPT

## 2025-04-25 PROCEDURE — 97110 THERAPEUTIC EXERCISES: CPT

## 2025-04-25 NOTE — PROGRESS NOTES
within 20 treatments:  1.Patient will have 150 deg of shoulder flexion and abduction to be able to perform self grooming tasks without modifications.   Status at last Eval/Progress Note: PROM only at this time  Current Status: PROM only at this time  Goal Met?  No     2.  Patient will have 4+/5 strength to be able to perform work in yard or garden without restrictions.   Status at last Eval/Progress Note: NT due to protocol restrictions   Current Status: NT due to protocol restrictions   Goal Met?  No     3. Patient will be able to sleep on the L shoulder without pain to demonstrate healing.   Status at last Eval/Progress Note: unable  Current Status: not able due to restrictions  Goal Met?  No     4. Patient will report less than 2/10 pain in the shoulder while perform raking or other garden tasks.   Status at last Eval/Progress Note: unable at this time  Current Status: not able due to restrictions  Goal Met?  No       []  See Progress Note/Recertification  []  See Discharge Summary    PLAN  [x]  Continue plan of care  [x]  Upgrade activities as tolerated  []  Discharge due to :  []  Other:      Carri Barkley, PT, DPT       4/25/2025       8:09 AM

## 2025-04-29 ENCOUNTER — HOSPITAL ENCOUNTER (OUTPATIENT)
Facility: HOSPITAL | Age: 70
Setting detail: RECURRING SERIES
Discharge: HOME | End: 2025-05-02
Payer: MEDICARE

## 2025-04-29 PROCEDURE — 97110 THERAPEUTIC EXERCISES: CPT

## 2025-04-29 PROCEDURE — 97016 VASOPNEUMATIC DEVICE THERAPY: CPT

## 2025-04-29 NOTE — PROGRESS NOTES
PHYSICAL THERAPY - MEDICARE DAILY TREATMENT NOTE (updated 3/23)    Date of Service: 2025        Patient Name:  Kit Maier :  1955   Medical   Diagnosis:  S/P arthroscopy of left shoulder [Z98.890] Treatment Diagnosis:  M25.512  LEFT SHOULDER PAIN    Referral Source:  Reid Solano MD Insurance:   Payor: MEDICARE / Plan: MEDICARE PART A AND B / Product Type: *No Product type* /    [x] Patient's date of birth verified                      Visit #   Current  / Total 7 10   Time   In / Out 0758 0900   Total Treatment Time (in minutes) 62    Total Timed Codes (in minutes) 52    1:1 Treatment Time (in minutes) 52       [facility] Totals Reminder:  bill using total billable   min of TIMED therapeutic procedures and modalities.   8-22 min = 1 unit; 23-37 min = 2 units; 38-52 min = 3 units; 53-67 min = 4 units; 68-82 min = 5 units        SUBJECTIVE  Pain Level (0-10 scale): 0/10    Any medication changes, allergies to medications, adverse drug reactions, diagnosis change, or new procedure performed?: No  Medications: [x] Verified on Patient Summary List    Subjective functional status/changes:     \"I am doing good today.\"    OBJECTIVE  Therapeutic Procedures:  Tx Min Billable or 1:1 Min (if diff from Tx Min) Procedure, Rationale, Specifics   52 52 [id number] Therapeutic Exercise (timed):  increase ROM, strength, coordination, balance, and proprioception to improve patient's ability to progress to PLOF and address remaining functional goals. (see flow sheet as applicable)   52 52    Total Total   [x] Patient Education billed concurrently with other procedures     Modalities Rationale:     decrease edema and decrease pain to improve patient's ability to progress to PLOF and address remaining functional goals.       min [] Estim Unattended,             []  NMES  [] PreMod       []  IFC  [] Other:  []w/US   []w/ice   []w/heat  Position:  Location:            min []  Mechanical Traction,        []  Cervical      []

## 2025-05-01 ENCOUNTER — HOSPITAL ENCOUNTER (OUTPATIENT)
Facility: HOSPITAL | Age: 70
Setting detail: RECURRING SERIES
Discharge: HOME | End: 2025-05-04
Payer: MEDICARE

## 2025-05-01 PROCEDURE — 97110 THERAPEUTIC EXERCISES: CPT

## 2025-05-01 PROCEDURE — 97016 VASOPNEUMATIC DEVICE THERAPY: CPT

## 2025-05-01 NOTE — PROGRESS NOTES
PHYSICAL THERAPY - MEDICARE DAILY TREATMENT NOTE (updated 3/23)    Date of Service: 2025        Patient Name:  Kit Maier :  1955   Medical   Diagnosis:  S/P arthroscopy of left shoulder [Z98.890] Treatment Diagnosis:  M25.512  LEFT SHOULDER PAIN    Referral Source:  Reid Solano MD Insurance:   Payor: MEDICARE / Plan: MEDICARE PART A AND B / Product Type: *No Product type* /    [x] Patient's date of birth verified                      Visit #   Current  / Total 8 10   Time   In / Out 0900 1005   Total Treatment Time (in minutes) 65    Total Timed Codes (in minutes) 55    1:1 Treatment Time (in minutes) 55       [facility] Totals Reminder:  bill using total billable   min of TIMED therapeutic procedures and modalities.   8-22 min = 1 unit; 23-37 min = 2 units; 38-52 min = 3 units; 53-67 min = 4 units; 68-82 min = 5 units        SUBJECTIVE  Pain Level (0-10 scale): 1/10    Any medication changes, allergies to medications, adverse drug reactions, diagnosis change, or new procedure performed?: No  Medications: [x] Verified on Patient Summary List    Subjective functional status/changes:     \"I am feeling good today.\"    OBJECTIVE  Therapeutic Procedures:  Tx Min Billable or 1:1 Min (if diff from Tx Min) Procedure, Rationale, Specifics   55 55 [id number] Therapeutic Exercise (timed):  increase ROM, strength, coordination, balance, and proprioception to improve patient's ability to progress to PLOF and address remaining functional goals. (see flow sheet as applicable)   55 55    Total Total   [x] Patient Education billed concurrently with other procedures     Modalities Rationale:     decrease edema and decrease pain to improve patient's ability to progress to PLOF and address remaining functional goals.       min [] Estim Unattended,             []  NMES  [] PreMod       []  IFC  [] Other:  []w/US   []w/ice   []w/heat  Position:  Location:            min []  Mechanical Traction,        []  Cervical      []

## 2025-05-06 ENCOUNTER — HOSPITAL ENCOUNTER (OUTPATIENT)
Facility: HOSPITAL | Age: 70
Setting detail: RECURRING SERIES
Discharge: HOME | End: 2025-05-09
Payer: MEDICARE

## 2025-05-06 PROCEDURE — 97110 THERAPEUTIC EXERCISES: CPT

## 2025-05-06 PROCEDURE — 97016 VASOPNEUMATIC DEVICE THERAPY: CPT

## 2025-05-06 NOTE — PROGRESS NOTES
[facility]  [address] [place] [id number]  Ph: [phone]     Fax: [phone]    CONTINUED PLAN OF CARE/RECERTIFICATION FOR PHYSICAL THERAPY          Patient Name:              Kit Maier :  1955   Treatment/Medical Diagnosis:  S/P arthroscopy of left shoulder [Z98.890]   Onset Date:  2025    Referral Source:  Reid Solano MD Start of Care (SOC):  2025   Prior Hospitalization:  See Medical History Provider #:  NPI      Prior Level of Function (PLOF):  independent   Comorbidities:  See Medical History   Medications:  Verified on Patient Summary List   Visits from SOC:  9 Missed Visits:  0       Summary of Care / Assessment / Recommendations:   Patient is 4 weeks post op at this time. He has gained good PROM of the shoulder at this time. We are transitioning to AAROM this visit which he tolerated well. We did test AROM in supine today with ROM not far off PROM measurements. He was encouraged to not overuse the arm at home as this is when he reports some pain. He is overall progressing well within protocol standards.  Patient will continue to benefit from skilled PT services to modify and progress therapeutic interventions, analyze and address functional mobility deficits, analyze and address ROM deficits, analyze and address strength deficits, and analyze and address soft tissue restrictions to address functional deficits and attain remaining goals.      Progress Toward Goals:   Short Term Goals, to be met within 10 treatments:  Patient will demonstrate independence and compliance with HEP in order to increase mobility and assist with carryover from PT services.  Status at last Eval/Progress Note: provided  Current Status: performing  Goal Met?  Yes     2.  Patient will be able to have > 150 deg of PROM abduction and flexion to demonstrate progress within protocol.   Status at last Eval/Progress Note: 118 flex 60 abd  Current Status:

## 2025-05-06 NOTE — PROGRESS NOTES
PHYSICAL THERAPY - MEDICARE DAILY TREATMENT NOTE (updated 3/23)    Date of Service: 2025        Patient Name:  Kit Maier :  1955   Medical   Diagnosis:  S/P arthroscopy of left shoulder [Z98.890] Treatment Diagnosis:  M25.512  LEFT SHOULDER PAIN    Referral Source:  Reid Solano MD Insurance:   Payor: MEDICARE / Plan: MEDICARE PART A AND B / Product Type: *No Product type* /    [x] Patient's date of birth verified                      Visit #   Current  / Total 9 10   Time   In / Out 0800 0900   Total Treatment Time (in minutes) 60    Total Timed Codes (in minutes) 50    1:1 Treatment Time (in minutes) 50       [facility] Totals Reminder:  bill using total billable   min of TIMED therapeutic procedures and modalities.   8-22 min = 1 unit; 23-37 min = 2 units; 38-52 min = 3 units; 53-67 min = 4 units; 68-82 min = 5 units        SUBJECTIVE  Pain Level (0-10 scale): 2/10    Any medication changes, allergies to medications, adverse drug reactions, diagnosis change, or new procedure performed?: No  Medications: [x] Verified on Patient Summary List    Subjective functional status/changes:     \"I think I over did it over the weekend and having some pain up to a 3/10.\"    OBJECTIVE  Therapeutic Procedures:  Tx Min Billable or 1:1 Min (if diff from Tx Min) Procedure, Rationale, Specifics   50 50 19832 Therapeutic Exercise (timed):  increase ROM, strength, coordination, balance, and proprioception to improve patient's ability to progress to PLOF and address remaining functional goals. (see flow sheet as applicable)   50 50    Total Total   [x] Patient Education billed concurrently with other procedures     Modalities Rationale:     decrease edema and decrease pain to improve patient's ability to progress to PLOF and address remaining functional goals.       min [] Estim Unattended,             []  NMES  [] PreMod       []  IFC  [] Other:  []w/US   []w/ice   []w/heat  Position:  Location:            min []

## 2025-05-08 ENCOUNTER — APPOINTMENT (OUTPATIENT)
Facility: HOSPITAL | Age: 70
End: 2025-05-08
Payer: MEDICARE

## 2025-05-09 ENCOUNTER — HOSPITAL ENCOUNTER (OUTPATIENT)
Facility: HOSPITAL | Age: 70
Setting detail: RECURRING SERIES
Discharge: HOME | End: 2025-05-12
Payer: MEDICARE

## 2025-05-09 PROCEDURE — 97150 GROUP THERAPEUTIC PROCEDURES: CPT

## 2025-05-09 PROCEDURE — 97016 VASOPNEUMATIC DEVICE THERAPY: CPT

## 2025-05-09 PROCEDURE — 97110 THERAPEUTIC EXERCISES: CPT

## 2025-05-09 NOTE — PROGRESS NOTES
PHYSICAL THERAPY - MEDICARE DAILY TREATMENT NOTE (updated 3/23)    Date of Service: 2025        Patient Name:  Kit Maier :  1955   Medical   Diagnosis:  S/P arthroscopy of left shoulder [Z98.890] Treatment Diagnosis:  M25.512  LEFT SHOULDER PAIN    Referral Source:  Reid Solano MD Insurance:   Payor: MEDICARE / Plan: MEDICARE PART A AND B / Product Type: *No Product type* /    [x] Patient's date of birth verified                      Visit #   Current  / Total 10 19   Time   In / Out 1400 1504   Total Treatment Time (in minutes) 64    Total Timed Codes (in minutes) 32    1:1 Treatment Time (in minutes) 32       [facility] Totals Reminder:  bill using total billable   min of TIMED therapeutic procedures and modalities.   8-22 min = 1 unit; 23-37 min = 2 units; 38-52 min = 3 units; 53-67 min = 4 units; 68-82 min = 5 units        SUBJECTIVE  Pain Level (0-10 scale): 0/10    Any medication changes, allergies to medications, adverse drug reactions, diagnosis change, or new procedure performed?: No  Medications: [x] Verified on Patient Summary List    Subjective functional status/changes:     \"I am doing good today.\"    OBJECTIVE  Therapeutic Procedures:  Tx Min Billable or 1:1 Min (if diff from Tx Min) Procedure, Rationale, Specifics   22 0 38699 Group Therapy (untimed): Billed while completing therapeutic exercise during beginning of treatment session to improve patient's ability to progress to PLOF and address remaining functional goals.  (see flow sheet as applicable)    32 32 71569 Therapeutic Exercise (timed):  increase ROM, strength, coordination, balance, and proprioception to improve patient's ability to progress to PLOF and address remaining functional goals. (see flow sheet as applicable)   54 32    Total Total   [x] Patient Education billed concurrently with other procedures     Modalities Rationale:     decrease edema and decrease pain to improve patient's ability to progress to PLOF and

## 2025-05-12 ENCOUNTER — HOSPITAL ENCOUNTER (OUTPATIENT)
Facility: HOSPITAL | Age: 70
Setting detail: RECURRING SERIES
Discharge: HOME | End: 2025-05-15
Payer: MEDICARE

## 2025-05-12 PROCEDURE — 97016 VASOPNEUMATIC DEVICE THERAPY: CPT

## 2025-05-12 PROCEDURE — 97150 GROUP THERAPEUTIC PROCEDURES: CPT

## 2025-05-12 PROCEDURE — 97110 THERAPEUTIC EXERCISES: CPT

## 2025-05-12 NOTE — PROGRESS NOTES
PHYSICAL THERAPY - MEDICARE DAILY TREATMENT NOTE (updated 3/23)    Date of Service: 2025        Patient Name:  Kit Maier :  1955   Medical   Diagnosis:  S/P arthroscopy of left shoulder [Z98.890] Treatment Diagnosis:  M25.512  LEFT SHOULDER PAIN    Referral Source:  Reid Solano MD Insurance:   Payor: MEDICARE / Plan: MEDICARE PART A AND B / Product Type: *No Product type* /    [x] Patient's date of birth verified                      Visit #   Current  / Total 11 19   Time   In / Out 0800 0900   Total Treatment Time (in minutes) 60    Total Timed Codes (in minutes) 36    1:1 Treatment Time (in minutes) 36       [facility] Totals Reminder:  bill using total billable   min of TIMED therapeutic procedures and modalities.   8-22 min = 1 unit; 23-37 min = 2 units; 38-52 min = 3 units; 53-67 min = 4 units; 68-82 min = 5 units        SUBJECTIVE  Pain Level (0-10 scale): 1.5/10    Any medication changes, allergies to medications, adverse drug reactions, diagnosis change, or new procedure performed?: No  Medications: [x] Verified on Patient Summary List    Subjective functional status/changes:     \"I am doing good today.\"    OBJECTIVE  Therapeutic Procedures:  Tx Min Billable or 1:1 Min (if diff from Tx Min) Procedure, Rationale, Specifics   14 0 15543 Group Therapy (untimed): Billed while completing therapeutic exercise during beginning of treatment session to improve patient's ability to progress to PLOF and address remaining functional goals.  (see flow sheet as applicable)    36 36 58153 Therapeutic Exercise (timed):  increase ROM, strength, coordination, balance, and proprioception to improve patient's ability to progress to PLOF and address remaining functional goals. (see flow sheet as applicable)   50 36    Total Total   [x] Patient Education billed concurrently with other procedures     Modalities Rationale:     decrease edema and decrease pain to improve patient's ability to progress to PLOF

## 2025-05-13 ENCOUNTER — APPOINTMENT (OUTPATIENT)
Facility: HOSPITAL | Age: 70
End: 2025-05-13
Payer: MEDICARE

## 2025-05-14 ENCOUNTER — HOSPITAL ENCOUNTER (OUTPATIENT)
Facility: HOSPITAL | Age: 70
Setting detail: RECURRING SERIES
Discharge: HOME | End: 2025-05-17
Payer: MEDICARE

## 2025-05-14 PROCEDURE — 97150 GROUP THERAPEUTIC PROCEDURES: CPT

## 2025-05-14 PROCEDURE — 97016 VASOPNEUMATIC DEVICE THERAPY: CPT

## 2025-05-14 NOTE — PROGRESS NOTES
PHYSICAL THERAPY - MEDICARE DAILY TREATMENT NOTE (updated 3/23)    Date of Service: 2025        Patient Name:  Kit Maier :  1955   Medical   Diagnosis:  S/P arthroscopy of left shoulder [Z98.890] Treatment Diagnosis:  M25.512  LEFT SHOULDER PAIN    Referral Source:  Reid Solano MD Insurance:   Payor: MEDICARE / Plan: MEDICARE PART A AND B / Product Type: *No Product type* /    [x] Patient's date of birth verified                      Visit #   Current  / Total 12 19   Time   In / Out 0755 0859   Total Treatment Time (in minutes) 64    Total Timed Codes (in minutes) 0    1:1 Treatment Time (in minutes) 0      [facility] Totals Reminder:  bill using total billable   min of TIMED therapeutic procedures and modalities.   8-22 min = 1 unit; 23-37 min = 2 units; 38-52 min = 3 units; 53-67 min = 4 units; 68-82 min = 5 units        SUBJECTIVE  Pain Level (0-10 scale): 1/10    Any medication changes, allergies to medications, adverse drug reactions, diagnosis change, or new procedure performed?: No  Medications: [x] Verified on Patient Summary List    Subjective functional status/changes:     \"I am doing good today with a little soreness.\"    OBJECTIVE  Therapeutic Procedures:  Tx Min Billable or 1:1 Min (if diff from Tx Min) Procedure, Rationale, Specifics   54 0 [id number] Group Therapy (untimed): Billed while completing therapeutic exercise during beginning of treatment session to improve patient's ability to progress to PLOF and address remaining functional goals.  (see flow sheet as applicable)    54 0    Total Total   [x] Patient Education billed concurrently with other procedures     Modalities Rationale:     decrease edema and decrease pain to improve patient's ability to progress to PLOF and address remaining functional goals.       min [] Estim Unattended,             []  NMES  [] PreMod       []  IFC  [] Other:  []w/US   []w/ice   []w/heat  Position:  Location:            min []  Mechanical

## 2025-05-15 ENCOUNTER — APPOINTMENT (OUTPATIENT)
Facility: HOSPITAL | Age: 70
End: 2025-05-15
Payer: MEDICARE

## 2025-05-19 ENCOUNTER — HOSPITAL ENCOUNTER (OUTPATIENT)
Facility: HOSPITAL | Age: 70
Setting detail: RECURRING SERIES
Discharge: HOME | End: 2025-05-22
Payer: MEDICARE

## 2025-05-19 PROCEDURE — 97016 VASOPNEUMATIC DEVICE THERAPY: CPT

## 2025-05-19 PROCEDURE — 97110 THERAPEUTIC EXERCISES: CPT

## 2025-05-19 NOTE — PROGRESS NOTES
Mechanical Traction,        []  Cervical      []  Lumbar        []  Prone         []  Supine           []  Intermitt.     []  Cont.     Lbs:    pounds  [] With heat  [] Simultaneously performed with E-Stim    min []  Ultrasound,    []Continuous   [] Pulsed  []1MHz   []3MHz Location:  W/cm2:    min  Unbilled []  Ice     []  Heat             Position:  Location:       10     min [x]  Vasopneumatic Device circumference at ACJ to L shoulder Pressure:       [x] lo [] med [] hi   Temperature: [x] lo [] med [] Hi    [x] With ice    [] Simultaneously performed with Estim     Skin assessment post-treatment (if applicable):    [x]  intact    [x]  redness- no adverse reaction []redness - adverse reaction:        Pain Level at end of session (0-10 scale): 0/10    Assessment   Patient did well with therapy today and tolerating progressions at each visit. We added in isometrics and abduction wall slides which he found challenging but was able to complete without any increase in pain. Patient has follow up with MD tomorrow 05/20/2025. Patient will continue to benefit from skilled PT services to modify and progress therapeutic interventions, analyze and address functional mobility deficits, analyze and address ROM deficits, analyze and address strength deficits, and analyze and address soft tissue restrictions to address functional deficits and attain remaining goals.    PRECAUTIONS:  See protocol in chart- MD note on 04/17/2025- sling PRN and no wt bearing to biceps until 12 weeks    Start AAROM to AROM 05/05/2025 until 05/19/2025    Sx date 04/04/2025        Date of Initial Evaluation: 4/7/2025  Date of last Progress Note: n/a  Visit # of last Progress Note: 1      Number of Insurance Authorized visits: Not Applicable    MD follow up 05/20/2025         Therapeutic Exercise Flow Sheet:                                                                                    Running Visit #    EXERCISE   11   12   13   14   15   16   17

## 2025-05-22 ENCOUNTER — HOSPITAL ENCOUNTER (OUTPATIENT)
Facility: HOSPITAL | Age: 70
Setting detail: RECURRING SERIES
Discharge: HOME | End: 2025-05-25
Payer: MEDICARE

## 2025-05-22 PROCEDURE — 97016 VASOPNEUMATIC DEVICE THERAPY: CPT

## 2025-05-22 PROCEDURE — 97110 THERAPEUTIC EXERCISES: CPT

## 2025-05-27 ENCOUNTER — HOSPITAL ENCOUNTER (OUTPATIENT)
Facility: HOSPITAL | Age: 70
Setting detail: RECURRING SERIES
Discharge: HOME | End: 2025-05-30
Payer: MEDICARE

## 2025-05-27 PROCEDURE — 97150 GROUP THERAPEUTIC PROCEDURES: CPT

## 2025-05-27 PROCEDURE — 97110 THERAPEUTIC EXERCISES: CPT

## 2025-05-27 PROCEDURE — 97016 VASOPNEUMATIC DEVICE THERAPY: CPT

## 2025-05-27 NOTE — PROGRESS NOTES
PHYSICAL THERAPY - MEDICARE DAILY TREATMENT NOTE (updated 3/23)    Date of Service: 2025        Patient Name:  Kit Maier :  1955   Medical   Diagnosis:  S/P arthroscopy of left shoulder [Z98.890] Treatment Diagnosis:  M25.512  LEFT SHOULDER PAIN    Referral Source:  Reid Solano MD Insurance:   Payor: MEDICARE / Plan: MEDICARE PART A AND B / Product Type: *No Product type* /    [x] Patient's date of birth verified                      Visit #   Current  / Total 15 19   Time   In / Out 0800 0904   Total Treatment Time (in minutes) 64    Total Timed Codes (in minutes) 39   1:1 Treatment Time (in minutes) 39      [facility] Totals Reminder:  bill using total billable   min of TIMED therapeutic procedures and modalities.   8-22 min = 1 unit; 23-37 min = 2 units; 38-52 min = 3 units; 53-67 min = 4 units; 68-82 min = 5 units        SUBJECTIVE  Pain Level (0-10 scale): 0/10    Any medication changes, allergies to medications, adverse drug reactions, diagnosis change, or new procedure performed?: No  Medications: [x] Verified on Patient Summary List    Subjective functional status/changes:     \"I have not done anything to make it hurt yet.\"    OBJECTIVE  Therapeutic Procedures:  Tx Min Billable or 1:1 Min (if diff from Tx Min) Procedure, Rationale, Specifics   15 0 34793 Group Therapy (untimed): Billed while completing therapeutic exercise during beginning of treatment session to improve patient's ability to progress to PLOF and address remaining functional goals.  (see flow sheet as applicable)    39 39 [id number] Therapeutic Exercise (timed):  increase ROM, strength, coordination, balance, and proprioception to improve patient's ability to progress to PLOF and address remaining functional goals. (see flow sheet as applicable)    54 39    Total Total   [x] Patient Education billed concurrently with other procedures     Modalities Rationale:     decrease edema and decrease pain to improve patient's ability

## 2025-05-29 ENCOUNTER — HOSPITAL ENCOUNTER (OUTPATIENT)
Facility: HOSPITAL | Age: 70
Setting detail: RECURRING SERIES
End: 2025-05-29
Payer: MEDICARE

## 2025-05-29 PROCEDURE — 97110 THERAPEUTIC EXERCISES: CPT

## 2025-05-29 PROCEDURE — 97016 VASOPNEUMATIC DEVICE THERAPY: CPT

## 2025-05-29 NOTE — PROGRESS NOTES
PHYSICAL THERAPY - MEDICARE DAILY TREATMENT NOTE (updated 3/23)    Date of Service: 2025        Patient Name:  Kit Maier :  1955   Medical   Diagnosis:  S/P arthroscopy of left shoulder [Z98.890] Treatment Diagnosis:  M25.512  LEFT SHOULDER PAIN    Referral Source:  Reid Solano MD Insurance:   Payor: MEDICARE / Plan: MEDICARE PART A AND B / Product Type: *No Product type* /    [x] Patient's date of birth verified                      Visit #   Current  / Total 16 19   Time   In / Out 0800 0905   Total Treatment Time (in minutes) 65    Total Timed Codes (in minutes) 49   1:1 Treatment Time (in minutes) 49      [facility] Totals Reminder:  bill using total billable   min of TIMED therapeutic procedures and modalities.   8-22 min = 1 unit; 23-37 min = 2 units; 38-52 min = 3 units; 53-67 min = 4 units; 68-82 min = 5 units        SUBJECTIVE  Pain Level (0-10 scale): 2/10    Any medication changes, allergies to medications, adverse drug reactions, diagnosis change, or new procedure performed?: No  Medications: [x] Verified on Patient Summary List    Subjective functional status/changes:     \"I am a little sore today as I may have done too much today.\"    OBJECTIVE  Therapeutic Procedures:  Tx Min Billable or 1:1 Min (if diff from Tx Min) Procedure, Rationale, Specifics   6 0 77811 Group Therapy (untimed): Billed while completing therapeutic exercise during beginning of treatment session to improve patient's ability to progress to PLOF and address remaining functional goals.  (see flow sheet as applicable)    49 49 [id number] Therapeutic Exercise (timed):  increase ROM, strength, coordination, balance, and proprioception to improve patient's ability to progress to PLOF and address remaining functional goals. (see flow sheet as applicable)    55 49    Total Total   [x] Patient Education billed concurrently with other procedures     Modalities Rationale:     decrease edema and decrease pain to improve

## 2025-06-03 ENCOUNTER — HOSPITAL ENCOUNTER (OUTPATIENT)
Facility: HOSPITAL | Age: 70
Setting detail: RECURRING SERIES
Discharge: HOME | End: 2025-06-06
Payer: MEDICARE

## 2025-06-03 PROCEDURE — 97110 THERAPEUTIC EXERCISES: CPT

## 2025-06-03 PROCEDURE — 97016 VASOPNEUMATIC DEVICE THERAPY: CPT

## 2025-06-03 PROCEDURE — 97150 GROUP THERAPEUTIC PROCEDURES: CPT

## 2025-06-03 NOTE — PROGRESS NOTES
Therapeutic Exercise Flow Sheet:                                                                                    Running Visit #    EXERCISE   11   12   13   14   15   16   17   18   19   20     UBE Lvl 1 x10 min Lvl 1 x12 min Lvl 1 x12 min Lvl 1 x12 min Lvl 1 x12 min Lvl 1 x12 min Lvl 2 x12 min         Wall Slide Flex x20 x20 And abd single arm x10 ea  And abd single arm x10 ea And abd single arm x10 ea And abd single arm x10 ea         Scap Retrac    x20 x20    x20 x20 x20 YTB x20 +ext         Shoulder Rolls and Shrugs    x20   x20   x20 x20 x20 x20   x20         Pulleys- Flex/Abd/HAB     3 min ea   3 min ea   3 min ea 3 min ea 3 min ea   3 min ea 3 min ea        Bicep Curls- 3 Way    X20 ea    X20 ea X20 ea X20 ea X20 ea X20 ea X20 ea         Wand Ex    Supine brown flex, abd, ER, HAB x20 ea       Supine brown flex, abd, ER, HAB, helicopter x20 ea Stand flex/abd x20 ea    Stand flex/abd x20 ea   Stand flex/abd x20 ea    4 way  20x         Finger Ladder- flex/abd      X5 ea X5 ea  X10 ea   X10 ea    x10 ea           Isometrics   10x10 sec ea  10x10 sec ea 10x10 sec ea 10x10 sec ea        ITY       AROM I and T 20x          Modalities to be included future treatment sessions: Vasopneumatic Compression  Has HEP been provided to patient? [] No    [x] Yes                                      Access Code:                    Date Provided: 04/07/2025  [] Reviewed HEP    [] Progressed/Changed HEP, details:         GOALS  Short Term Goals, to be met within 10 treatments:  Patient will demonstrate independence and compliance with HEP in order to increase mobility and assist with carryover from PT services.  Status at last Eval/Progress Note: provided  Current Status: performing  Goal Met?  Yes     2.  Patient will be able to have > 150 deg of PROM abduction and flexion to demonstrate progress within protocol.   Status at last Eval/Progress Note: 118 flex 60 abd  Current Status: 170 flex 170 abd PROM; 160 flex and 150

## 2025-06-05 ENCOUNTER — HOSPITAL ENCOUNTER (OUTPATIENT)
Facility: HOSPITAL | Age: 70
Setting detail: RECURRING SERIES
Discharge: HOME | End: 2025-06-08
Payer: MEDICARE

## 2025-06-05 PROCEDURE — 97016 VASOPNEUMATIC DEVICE THERAPY: CPT

## 2025-06-05 PROCEDURE — 97110 THERAPEUTIC EXERCISES: CPT

## 2025-06-05 NOTE — PROGRESS NOTES
with HEP in order to increase mobility and assist with carryover from PT services.  Status at last Eval/Progress Note: performing  Current Status: performing  Goal Met?  Yes     2.  Patient will be able to have > 150 deg of PROM abduction and flexion to demonstrate progress within protocol.   Status at last Eval/Progress Note: 170 flex 170 abd PROM; 160 flex and 150 abd supine AROM  Current Status: 160 flex and 170 ABD standing AROM  Goal Met?  Yes     3. Patient will have 4-/5 strength to be able to  cup of liquid or light plate of food.   Status at last Eval/Progress Note: 3-/5  Current Status: 3/5 weakest   Goal Met?  In Progress        Long Term Goals, to be met within 20 treatments:  1.Patient will have 150 deg of shoulder flexion and abduction to be able to perform self grooming tasks without modifications.   Status at last Eval/Progress Note: 170 flex 170 abd PROM; 160 flex and 150 abd supine AROM  Current Status: 160 flex and 170 abd AROM standing  Goal Met?  Yes     2.  Patient will have 4+/5 strength to be able to perform work in yard or garden without restrictions.   Status at last Eval/Progress Note: 3-/5   Current Status: 3+/5  Goal Met?  In Progress     3. Patient will be able to sleep on the L shoulder without pain to demonstrate healing.   Status at last Eval/Progress Note: unable  Current Status: cannot sleep on it without pain at the moment  Goal Met?  No     4. Patient will report less than 2/10 pain in the shoulder while perform raking or other garden tasks.   Status at last Eval/Progress Note: unable at this time  Current Status: not cleared to perform task  Goal Met?  No       Frequency/Duration:  2 treatments per week, for 10 additional treatments.    RECOMMENDATIONS  [x]  Continue plan of care  [x]  Upgrade activities as tolerated    Certification Period:  6/5/2025 to 9/3/2025      Carri Barkley, PT, DPT       6/5/2025       9:44 
raking or other garden tasks.   Status at last Eval/Progress Note: unable at this time  Current Status: not cleared to perform task  Goal Met?  No       [x]  See Progress Note/Recertification  []  See Discharge Summary    PLAN  [x]  Continue plan of care  [x]  Upgrade activities as tolerated  []  Discharge due to :  []  Other:      Carri Barkley, PT, DPT       6/5/2025       7:55 AM

## 2025-06-10 ENCOUNTER — HOSPITAL ENCOUNTER (OUTPATIENT)
Facility: HOSPITAL | Age: 70
Setting detail: RECURRING SERIES
Discharge: HOME | End: 2025-06-13
Payer: MEDICARE

## 2025-06-10 PROCEDURE — 97110 THERAPEUTIC EXERCISES: CPT

## 2025-06-10 PROCEDURE — 97016 VASOPNEUMATIC DEVICE THERAPY: CPT

## 2025-06-10 NOTE — PROGRESS NOTES
PROM; 160 flex and 150 abd supine AROM  Current Status: 160 flex and 170 ABD standing AROM  Goal Met?  Yes     3. Patient will have 4-/5 strength to be able to  cup of liquid or light plate of food.   Status at last Eval/Progress Note: 3-/5  Current Status: 3/5 weakest   Goal Met?  In Progress        Long Term Goals, to be met within 20 treatments:  1.Patient will have 150 deg of shoulder flexion and abduction to be able to perform self grooming tasks without modifications.   Status at last Eval/Progress Note: 170 flex 170 abd PROM; 160 flex and 150 abd supine AROM  Current Status: 160 flex and 170 abd AROM standing  Goal Met?  Yes     2.  Patient will have 4+/5 strength to be able to perform work in yard or garden without restrictions.   Status at last Eval/Progress Note: 3-/5   Current Status: 3+/5  Goal Met?  In Progress     3. Patient will be able to sleep on the L shoulder without pain to demonstrate healing.   Status at last Eval/Progress Note: unable  Current Status: cannot sleep on it without pain at the moment  Goal Met?  No     4. Patient will report less than 2/10 pain in the shoulder while perform raking or other garden tasks.   Status at last Eval/Progress Note: unable at this time  Current Status: not cleared to perform task  Goal Met?  No       [x]  See Progress Note/Recertification  []  See Discharge Summary    PLAN  [x]  Continue plan of care  [x]  Upgrade activities as tolerated  []  Discharge due to :  []  Other:      Felisha Enciso, PT, DPT       6/10/2025       8:07 AM

## 2025-06-12 ENCOUNTER — HOSPITAL ENCOUNTER (OUTPATIENT)
Facility: HOSPITAL | Age: 70
Setting detail: RECURRING SERIES
Discharge: HOME | End: 2025-06-15
Payer: MEDICARE

## 2025-06-12 PROCEDURE — 97110 THERAPEUTIC EXERCISES: CPT

## 2025-06-12 PROCEDURE — 97150 GROUP THERAPEUTIC PROCEDURES: CPT

## 2025-06-12 PROCEDURE — 97016 VASOPNEUMATIC DEVICE THERAPY: CPT

## 2025-06-12 NOTE — PROGRESS NOTES
PHYSICAL THERAPY - MEDICARE DAILY TREATMENT NOTE (updated 3/23)    Date of Service: 2025        Patient Name:  Kit Maier :  1955   Medical   Diagnosis:  S/P arthroscopy of left shoulder [Z98.890] Treatment Diagnosis:  M25.512  LEFT SHOULDER PAIN    Referral Source:  Reid Solano MD Insurance:   Payor: MEDICARE / Plan: MEDICARE PART A AND B / Product Type: *No Product type* /    [x] Patient's date of birth verified                      Visit #   Current  / Total 20 28   Time   In / Out 7:51 9:02   Total Treatment Time (in minutes) 71    Total Timed Codes (in minutes) 61    1:1 Treatment Time (in minutes) 48       [facility] Totals Reminder:  bill using total billable   min of TIMED therapeutic procedures and modalities.   8-22 min = 1 unit; 23-37 min = 2 units; 38-52 min = 3 units; 53-67 min = 4 units; 68-82 min = 5 units        SUBJECTIVE  Pain Level (0-10 scale): 1/10    Any medication changes, allergies to medications, adverse drug reactions, diagnosis change, or new procedure performed?: No  Medications: [x] Verified on Patient Summary List    Subjective functional status/changes:     \"I'm not hurting that bad..\"    OBJECTIVE  Therapeutic Procedures:  Tx Min Billable or 1:1 Min (if diff from Tx Min) Procedure, Rationale, Specifics   48 48 18004 Therapeutic Exercise (timed):  increase ROM, strength, coordination, balance, and proprioception to improve patient's ability to progress to PLOF and address remaining functional goals. (see flow sheet as applicable)   13  [id number] Group Therapy (untimed): Billed while completing therapeutic exercise during middle of treatment session to improve patient's ability to progress to PLOF and address remaining functional goals.  (see flow sheet as applicable)              61 48    Total Total   [x] Patient Education billed concurrently with other procedures     Modalities Rationale:     decrease edema, decrease inflammation, decrease pain, and increase tissue

## 2025-06-17 ENCOUNTER — HOSPITAL ENCOUNTER (OUTPATIENT)
Facility: HOSPITAL | Age: 70
Setting detail: RECURRING SERIES
Discharge: HOME | End: 2025-06-20
Payer: MEDICARE

## 2025-06-17 PROCEDURE — 97110 THERAPEUTIC EXERCISES: CPT

## 2025-06-17 PROCEDURE — 97016 VASOPNEUMATIC DEVICE THERAPY: CPT

## 2025-06-17 PROCEDURE — 97150 GROUP THERAPEUTIC PROCEDURES: CPT

## 2025-06-17 NOTE — PROGRESS NOTES
PHYSICAL THERAPY - MEDICARE DAILY TREATMENT NOTE (updated 3/23)    Date of Service: 2025        Patient Name:  Kit Maier :  1955   Medical   Diagnosis:  S/P arthroscopy of left shoulder [Z98.890] Treatment Diagnosis:  M25.512  LEFT SHOULDER PAIN    Referral Source:  Reid Solano MD Insurance:   Payor: MEDICARE / Plan: MEDICARE PART A AND B / Product Type: *No Product type* /    [x] Patient's date of birth verified                      Visit #   Current  / Total 21 28   Time   In / Out 7:48 8:49   Total Treatment Time (in minutes) 61    Total Timed Codes (in minutes) 51    1:1 Treatment Time (in minutes) 42       [facility] Totals Reminder:  bill using total billable   min of TIMED therapeutic procedures and modalities.   8-22 min = 1 unit; 23-37 min = 2 units; 38-52 min = 3 units; 53-67 min = 4 units; 68-82 min = 5 units        SUBJECTIVE  Pain Level (0-10 scale): 1/10    Any medication changes, allergies to medications, adverse drug reactions, diagnosis change, or new procedure performed?: No  Medications: [x] Verified on Patient Summary List    Subjective functional status/changes:     \"I'm not hurting too bad. I was a little sore when I got here, but after I worked out I felt good..\"    OBJECTIVE  Therapeutic Procedures:  Tx Min Billable or 1:1 Min (if diff from Tx Min) Procedure, Rationale, Specifics   42 42 [id number] Therapeutic Exercise (timed):  increase ROM, strength, coordination, balance, and proprioception to improve patient's ability to progress to PLOF and address remaining functional goals. (see flow sheet as applicable)   9  27279 Group Therapy (untimed): Billed while completing therapeutic exercise during end of treatment session to improve patient's ability to progress to PLOF and address remaining functional goals.  (see flow sheet as applicable)              51 42    Total Total   [x] Patient Education billed concurrently with other procedures     Modalities Rationale:

## 2025-06-19 ENCOUNTER — HOSPITAL ENCOUNTER (OUTPATIENT)
Facility: HOSPITAL | Age: 70
Setting detail: RECURRING SERIES
Discharge: HOME | End: 2025-06-22
Payer: MEDICARE

## 2025-06-19 PROCEDURE — 97110 THERAPEUTIC EXERCISES: CPT

## 2025-06-19 PROCEDURE — 97150 GROUP THERAPEUTIC PROCEDURES: CPT

## 2025-06-19 PROCEDURE — 97016 VASOPNEUMATIC DEVICE THERAPY: CPT

## 2025-06-19 NOTE — PROGRESS NOTES
PHYSICAL THERAPY - MEDICARE DAILY TREATMENT NOTE (updated 3/23)    Date of Service: 2025        Patient Name:  Kit Maier :  1955   Medical   Diagnosis:  S/P arthroscopy of left shoulder [Z98.890] Treatment Diagnosis:  M25.512  LEFT SHOULDER PAIN    Referral Source:  Reid Solano MD Insurance:   Payor: MEDICARE / Plan: MEDICARE PART A AND B / Product Type: *No Product type* /    [x] Patient's date of birth verified                      Visit #   Current  / Total 22 28   Time   In / Out 0800 0907   Total Treatment Time (in minutes) 67    Total Timed Codes (in minutes) 30    1:1 Treatment Time (in minutes) 30       [facility] Totals Reminder:  bill using total billable   min of TIMED therapeutic procedures and modalities.   8-22 min = 1 unit; 23-37 min = 2 units; 38-52 min = 3 units; 53-67 min = 4 units; 68-82 min = 5 units        SUBJECTIVE  Pain Level (0-10 scale): 1/10    Any medication changes, allergies to medications, adverse drug reactions, diagnosis change, or new procedure performed?: No  Medications: [x] Verified on Patient Summary List    Subjective functional status/changes:     \"I am only sore and stiff today.\"    OBJECTIVE  Therapeutic Procedures:  Tx Min Billable or 1:1 Min (if diff from Tx Min) Procedure, Rationale, Specifics   30 30 04452 Therapeutic Exercise (timed):  increase ROM, strength, coordination, balance, and proprioception to improve patient's ability to progress to PLOF and address remaining functional goals. (see flow sheet as applicable)   27 0 63705 Group Therapy (untimed): Billed while completing therapeutic exercise during end of treatment session to improve patient's ability to progress to PLOF and address remaining functional goals.  (see flow sheet as applicable)   57 30    Total Total   [x] Patient Education billed concurrently with other procedures     Modalities Rationale:     decrease edema, decrease inflammation, decrease pain, and increase tissue

## 2025-06-24 ENCOUNTER — HOSPITAL ENCOUNTER (OUTPATIENT)
Facility: HOSPITAL | Age: 70
Setting detail: RECURRING SERIES
Discharge: HOME | End: 2025-06-27
Payer: MEDICARE

## 2025-06-24 PROCEDURE — 97110 THERAPEUTIC EXERCISES: CPT

## 2025-06-24 PROCEDURE — 97150 GROUP THERAPEUTIC PROCEDURES: CPT

## 2025-06-24 PROCEDURE — 97016 VASOPNEUMATIC DEVICE THERAPY: CPT

## 2025-06-24 NOTE — PROGRESS NOTES
PHYSICAL THERAPY - MEDICARE DAILY TREATMENT NOTE (updated 3/23)    Date of Service: 2025        Patient Name:  Kit Maier :  1955   Medical   Diagnosis:  S/P arthroscopy of left shoulder [Z98.890] Treatment Diagnosis:  M25.512  LEFT SHOULDER PAIN    Referral Source:  Reid Solano MD Insurance:   Payor: MEDICARE / Plan: MEDICARE PART A AND B / Product Type: *No Product type* /    [x] Patient's date of birth verified                      Visit #   Current  / Total 23 28   Time   In / Out 0800 0908   Total Treatment Time (in minutes) 68    Total Timed Codes (in minutes) 33   1:1 Treatment Time (in minutes) 33       [facility] Totals Reminder:  bill using total billable   min of TIMED therapeutic procedures and modalities.   8-22 min = 1 unit; 23-37 min = 2 units; 38-52 min = 3 units; 53-67 min = 4 units; 68-82 min = 5 units        SUBJECTIVE  Pain Level (0-10 scale): 1/10    Any medication changes, allergies to medications, adverse drug reactions, diagnosis change, or new procedure performed?: No  Medications: [x] Verified on Patient Summary List    Subjective functional status/changes:     \"I am sore and nagging.\"    OBJECTIVE  Therapeutic Procedures:  Tx Min Billable or 1:1 Min (if diff from Tx Min) Procedure, Rationale, Specifics   33 33 58188 Therapeutic Exercise (timed):  increase ROM, strength, coordination, balance, and proprioception to improve patient's ability to progress to PLOF and address remaining functional goals. (see flow sheet as applicable)   22 0 78876 Group Therapy (untimed): Billed while completing therapeutic exercise during end of treatment session to improve patient's ability to progress to PLOF and address remaining functional goals.  (see flow sheet as applicable)   55 33    Patient Education billed concurrently with other procedures     Modalities Rationale:     decrease edema, decrease inflammation, decrease pain, and increase tissue extensibility to improve patient's

## 2025-06-26 ENCOUNTER — HOSPITAL ENCOUNTER (OUTPATIENT)
Facility: HOSPITAL | Age: 70
Setting detail: RECURRING SERIES
Discharge: HOME | End: 2025-06-29
Payer: MEDICARE

## 2025-06-26 PROCEDURE — 97016 VASOPNEUMATIC DEVICE THERAPY: CPT

## 2025-06-26 PROCEDURE — 97150 GROUP THERAPEUTIC PROCEDURES: CPT

## 2025-06-26 PROCEDURE — 97110 THERAPEUTIC EXERCISES: CPT

## 2025-06-26 NOTE — PROGRESS NOTES
PHYSICAL THERAPY - MEDICARE DAILY TREATMENT NOTE (updated 3/23)    Date of Service: 2025        Patient Name:  Kit Maier :  1955   Medical   Diagnosis:  S/P arthroscopy of left shoulder [Z98.890] Treatment Diagnosis:  M25.512  LEFT SHOULDER PAIN    Referral Source:  Reid Solano MD Insurance:   Payor: MEDICARE / Plan: MEDICARE PART A AND B / Product Type: *No Product type* /    [x] Patient's date of birth verified                      Visit #   Current  / Total 24 28   Time   In / Out 0801 0900   Total Treatment Time (in minutes) 59    Total Timed Codes (in minutes) 30   1:1 Treatment Time (in minutes) 30       [facility] Totals Reminder:  bill using total billable   min of TIMED therapeutic procedures and modalities.   8-22 min = 1 unit; 23-37 min = 2 units; 38-52 min = 3 units; 53-67 min = 4 units; 68-82 min = 5 units        SUBJECTIVE  Pain Level (0-10 scale): 1/10    Any medication changes, allergies to medications, adverse drug reactions, diagnosis change, or new procedure performed?: No  Medications: [x] Verified on Patient Summary List    Subjective functional status/changes:     \"I am doing okay.\"    OBJECTIVE  Therapeutic Procedures:  Tx Min Billable or 1:1 Min (if diff from Tx Min) Procedure, Rationale, Specifics   30 30 74567 Therapeutic Exercise (timed):  increase ROM, strength, coordination, balance, and proprioception to improve patient's ability to progress to PLOF and address remaining functional goals. (see flow sheet as applicable)   19 0 22317 Group Therapy (untimed): Billed while completing therapeutic exercise during end of treatment session to improve patient's ability to progress to PLOF and address remaining functional goals.  (see flow sheet as applicable)   49 30    Patient Education billed concurrently with other procedures     Modalities Rationale:     decrease edema, decrease inflammation, decrease pain, and increase tissue extensibility to improve patient's ability

## 2025-07-01 ENCOUNTER — HOSPITAL ENCOUNTER (OUTPATIENT)
Facility: HOSPITAL | Age: 70
Setting detail: RECURRING SERIES
Discharge: HOME | End: 2025-07-04
Payer: MEDICARE

## 2025-07-01 PROCEDURE — 97110 THERAPEUTIC EXERCISES: CPT

## 2025-07-01 PROCEDURE — 97016 VASOPNEUMATIC DEVICE THERAPY: CPT

## 2025-07-01 PROCEDURE — 97150 GROUP THERAPEUTIC PROCEDURES: CPT

## 2025-07-03 ENCOUNTER — APPOINTMENT (OUTPATIENT)
Facility: HOSPITAL | Age: 70
End: 2025-07-03
Payer: MEDICARE

## 2025-07-07 ENCOUNTER — HOSPITAL ENCOUNTER (OUTPATIENT)
Facility: HOSPITAL | Age: 70
Setting detail: RECURRING SERIES
Discharge: HOME | End: 2025-07-10
Payer: MEDICARE

## 2025-07-07 PROCEDURE — 97110 THERAPEUTIC EXERCISES: CPT

## 2025-07-07 PROCEDURE — 97150 GROUP THERAPEUTIC PROCEDURES: CPT

## 2025-07-07 PROCEDURE — 97016 VASOPNEUMATIC DEVICE THERAPY: CPT

## 2025-07-07 NOTE — PROGRESS NOTES
[facility]  [address] [place] [id number]  Ph: [phone]     Fax: [phone]    CONTINUED PLAN OF CARE/RECERTIFICATION FOR PHYSICAL THERAPY          Patient Name:              Kit Maier :  1955   Treatment/Medical Diagnosis:  S/P arthroscopy of left shoulder [Z98.890]   Onset Date:  2025    Referral Source:  Reid Solano MD Start of Care (SOC):  2025   Prior Hospitalization:  See Medical History Provider #:  NPI      Prior Level of Function (PLOF):  Independent with all ADL's    Comorbidities:  See Medical History   Medications:  Verified on Patient Summary List   Visits from SOC:  26 Missed Visits:  0       Summary of Care / Assessment / Recommendations:   Patient has been seen for 26 visits for follow up treatment of L shoulder surgery and has missed 0 appointments. Patient has improved on his ROM and strength in all directions, which correlates with his healing timeline. Patient still has slight increase in pain in both IR and ER, but is working on strengthening within the range that he has. Patient is in the final phase of protocol. He returns to the MD 7/10/2025 to clear all final restrictions. Patient will continue to benefit from skilled PT services to modify and progress therapeutic interventions, analyze and address functional mobility deficits, analyze and address ROM deficits, analyze and address strength deficits, analyze and address soft tissue restrictions, analyze and cue for proper movement patterns, and analyze and modify for postural abnormalities to address functional deficits and attain remaining goals.       Progress Toward Goals:   Short Term Goals, to be met within 10 treatments:  Patient will demonstrate independence and compliance with HEP in order to increase mobility and assist with carryover from PT services.  Status at last Eval/Progress Note: performing  Current Status: performing  Goal Met?  Yes     2.  
ability to progress to PLOF and address remaining functional goals. (see flow sheet as applicable)   15 0 15039 Group Therapy (untimed): Billed while completing therapeutic exercise during end of treatment session to improve patient's ability to progress to PLOF and address remaining functional goals.  (see flow sheet as applicable)   50 35    Patient Education billed concurrently with other procedures     Modalities Rationale:     decrease edema, decrease inflammation, decrease pain, and increase tissue extensibility to improve patient's ability to progress to PLOF and address remaining functional goals.       min [] Estim Unattended,             []  NMES  [] PreMod       []  IFC  [] Other:  []w/US   []w/ice   []w/heat  Position:  Location:            min []  Mechanical Traction,        []  Cervical      []  Lumbar        []  Prone         []  Supine           []  Intermitt.     []  Cont.     Lbs:    pounds  [] With heat  [] Simultaneously performed with E-Stim    min []  Ultrasound,    []Continuous   [] Pulsed  []1MHz   []3MHz Location:  W/cm2:    min  Unbilled []  Ice     []  Heat             Position:  Location:       10     min [x]  Vasopneumatic Device to Left shoulder       Pressure:       [x] lo [] med [] hi   Temperature: [x] lo [] med [] Hi    [x] With ice    [] Simultaneously performed with Estim     Skin assessment post-treatment (if applicable):    [x]  intact    [x]  redness- no adverse reaction []redness - adverse reaction:        Pain Level at end of session (0-10 scale): 0/10    Assessment   Patient has been seen for 26 visits for follow up treatment of L shoulder surgery and has missed 0 appointments. Patient has improved on his ROM and strength in all directions, which correlates with his healing timeline. Patient still has slight increase in pain in both IR and ER, but is working on strengthening within the range that he has. Patient tolerated treatment well today with progressing his

## 2025-07-09 ENCOUNTER — HOSPITAL ENCOUNTER (OUTPATIENT)
Facility: HOSPITAL | Age: 70
Setting detail: RECURRING SERIES
Discharge: HOME | End: 2025-07-12
Payer: MEDICARE

## 2025-07-09 PROCEDURE — 97110 THERAPEUTIC EXERCISES: CPT

## 2025-07-09 PROCEDURE — 97150 GROUP THERAPEUTIC PROCEDURES: CPT

## 2025-07-09 PROCEDURE — 97016 VASOPNEUMATIC DEVICE THERAPY: CPT

## 2025-07-15 ENCOUNTER — HOSPITAL ENCOUNTER (OUTPATIENT)
Facility: HOSPITAL | Age: 70
Setting detail: RECURRING SERIES
Discharge: HOME | End: 2025-07-18
Payer: MEDICARE

## 2025-07-15 PROCEDURE — 97016 VASOPNEUMATIC DEVICE THERAPY: CPT

## 2025-07-15 PROCEDURE — 97110 THERAPEUTIC EXERCISES: CPT

## 2025-07-15 PROCEDURE — 97150 GROUP THERAPEUTIC PROCEDURES: CPT

## 2025-07-15 NOTE — PROGRESS NOTES
PHYSICAL THERAPY - MEDICARE DAILY TREATMENT NOTE (updated 3/23)    Date of Service: 7/15/2025        Patient Name:  Kit Maier :  1955   Medical   Diagnosis:  S/P arthroscopy of left shoulder [Z98.890] Treatment Diagnosis:  M25.512  LEFT SHOULDER PAIN    Referral Source:  Reid Solano MD Insurance:   Payor: MEDICARE / Plan: MEDICARE PART A AND B / Product Type: *No Product type* /    [x] Patient's date of birth verified                      Visit #   Current  / Total 28 37   Time   In / Out 0759 0904   Total Treatment Time (in minutes) 65    Total Timed Codes (in minutes) 35   1:1 Treatment Time (in minutes) 35      [facility] Totals Reminder:  bill using total billable   min of TIMED therapeutic procedures and modalities.   8-22 min = 1 unit; 23-37 min = 2 units; 38-52 min = 3 units; 53-67 min = 4 units; 68-82 min = 5 units        SUBJECTIVE  Pain Level (0-10 scale): 0/10    Any medication changes, allergies to medications, adverse drug reactions, diagnosis change, or new procedure performed?: No  Medications: [x] Verified on Patient Summary List    Subjective functional status/changes:     \"I had some aches and pain but the dr said that's normal and he likes where I am.\"    OBJECTIVE  Therapeutic Procedures:  Tx Min Billable or 1:1 Min (if diff from Tx Min) Procedure, Rationale, Specifics   35 35 83763 Therapeutic Exercise (timed):  increase ROM, strength, coordination, balance, and proprioception to improve patient's ability to progress to PLOF and address remaining functional goals. (see flow sheet as applicable)   20 0 17197 Group Therapy (untimed): Billed while completing therapeutic exercise during end of treatment session to improve patient's ability to progress to PLOF and address remaining functional goals.  (see flow sheet as applicable)   55 35    Patient Education billed concurrently with other procedures     Modalities Rationale:     decrease edema, decrease inflammation, decrease pain,

## 2025-07-17 ENCOUNTER — HOSPITAL ENCOUNTER (OUTPATIENT)
Facility: HOSPITAL | Age: 70
Setting detail: RECURRING SERIES
Discharge: HOME | End: 2025-07-20
Payer: MEDICARE

## 2025-07-17 PROCEDURE — 97110 THERAPEUTIC EXERCISES: CPT

## 2025-07-17 PROCEDURE — 97016 VASOPNEUMATIC DEVICE THERAPY: CPT

## 2025-07-17 NOTE — PROGRESS NOTES
PHYSICAL THERAPY - MEDICARE DAILY TREATMENT NOTE (updated 3/23)    Date of Service: 2025        Patient Name:  Kit Maier :  1955   Medical   Diagnosis:  S/P arthroscopy of left shoulder [Z98.890] Treatment Diagnosis:  M25.512  LEFT SHOULDER PAIN    Referral Source:  Reid Solano MD Insurance:   Payor: MEDICARE / Plan: MEDICARE PART A AND B / Product Type: *No Product type* /    [x] Patient's date of birth verified                      Visit #   Current  / Total 29 37   Time   In / Out 0800 0905   Total Treatment Time (in minutes) 65    Total Timed Codes (in minutes) 55   1:1 Treatment Time (in minutes) 55      [facility] Totals Reminder:  bill using total billable   min of TIMED therapeutic procedures and modalities.   8-22 min = 1 unit; 23-37 min = 2 units; 38-52 min = 3 units; 53-67 min = 4 units; 68-82 min = 5 units        SUBJECTIVE  Pain Level (0-10 scale): 0/10    Any medication changes, allergies to medications, adverse drug reactions, diagnosis change, or new procedure performed?: No  Medications: [x] Verified on Patient Summary List    Subjective functional status/changes:     \"I'm doing good, I just cut myself trying to clean out my truck bed and it just doesn't look pretty.\"    OBJECTIVE  Therapeutic Procedures:  Tx Min Billable or 1:1 Min (if diff from Tx Min) Procedure, Rationale, Specifics   55 55 80610 Therapeutic Exercise (timed):  increase ROM, strength, coordination, balance, and proprioception to improve patient's ability to progress to PLOF and address remaining functional goals. (see flow sheet as applicable)   55 55    Patient Education billed concurrently with other procedures     Modalities Rationale:     decrease edema, decrease inflammation, decrease pain, and increase tissue extensibility to improve patient's ability to progress to PLOF and address remaining functional goals.       min [] Estim Unattended,             []  NMES  [] PreMod       []  IFC  [] Other:

## 2025-07-22 ENCOUNTER — HOSPITAL ENCOUNTER (OUTPATIENT)
Facility: HOSPITAL | Age: 70
Setting detail: RECURRING SERIES
Discharge: HOME | End: 2025-07-25
Payer: MEDICARE

## 2025-07-22 PROCEDURE — 97016 VASOPNEUMATIC DEVICE THERAPY: CPT

## 2025-07-22 PROCEDURE — 97110 THERAPEUTIC EXERCISES: CPT

## 2025-07-22 NOTE — PROGRESS NOTES
PHYSICAL THERAPY - MEDICARE DAILY TREATMENT NOTE (updated 3/23)    Date of Service: 2025        Patient Name:  Kit Maier :  1955   Medical   Diagnosis:  S/P arthroscopy of left shoulder [Z98.890] Treatment Diagnosis:  M25.512  LEFT SHOULDER PAIN    Referral Source:  Reid Solano MD Insurance:   Payor: MEDICARE / Plan: MEDICARE PART A AND B / Product Type: *No Product type* /    [x] Patient's date of birth verified                      Visit #   Current  / Total 30 37   Time   In / Out 0800 0904   Total Treatment Time (in minutes) 64    Total Timed Codes (in minutes) 54   1:1 Treatment Time (in minutes) 54      [facility] Totals Reminder:  bill using total billable   min of TIMED therapeutic procedures and modalities.   8-22 min = 1 unit; 23-37 min = 2 units; 38-52 min = 3 units; 53-67 min = 4 units; 68-82 min = 5 units        SUBJECTIVE  Pain Level (0-10 scale): 0/10    Any medication changes, allergies to medications, adverse drug reactions, diagnosis change, or new procedure performed?: No  Medications: [x] Verified on Patient Summary List    Subjective functional status/changes:     \"I have no pain. I was able to drive my tractor with this arm for the first time in over 1 1/2 years.\"    OBJECTIVE  Therapeutic Procedures:  Tx Min Billable or 1:1 Min (if diff from Tx Min) Procedure, Rationale, Specifics   54 54 [id number] Therapeutic Exercise (timed):  increase ROM, strength, coordination, balance, and proprioception to improve patient's ability to progress to PLOF and address remaining functional goals. (see flow sheet as applicable)   54 54    Patient Education billed concurrently with other procedures     Modalities Rationale:     decrease edema, decrease inflammation, decrease pain, and increase tissue extensibility to improve patient's ability to progress to PLOF and address remaining functional goals.       min [] Estim Unattended,             []  NMES  [] PreMod       []  IFC  [] Other:

## 2025-07-24 ENCOUNTER — HOSPITAL ENCOUNTER (OUTPATIENT)
Facility: HOSPITAL | Age: 70
Setting detail: RECURRING SERIES
Discharge: HOME | End: 2025-07-27
Payer: MEDICARE

## 2025-07-24 PROCEDURE — 97110 THERAPEUTIC EXERCISES: CPT

## 2025-07-24 PROCEDURE — 97150 GROUP THERAPEUTIC PROCEDURES: CPT

## 2025-07-24 NOTE — THERAPY DISCHARGE
Patient will have 4+/5 strength to be able to perform work in yard or garden without restrictions.   Status at last Eval/Progress Note: lowest 4-/5  Current Status: lowest 4+/5  Goal Met?  Yes     3. Patient will be able to sleep on the L shoulder without pain to demonstrate healing.   Status at last Eval/Progress Note: cannot sleep on it without pain at the moment  Current Status: no issues sleeping on that side  Goal Met?  Yes     4. Patient will report less than 2/10 pain in the shoulder while perform raking or other garden tasks.   Status at last Eval/Progress Note: slight pain at most a 2/10  Current Status:no issues  Goal Met?  Yes       RECOMMENDATIONS  Discontinue therapy. Progressing towards or have reached established goals.    Carri Barkley PT, DPT       2025       9:37 AM    _____________________________________________________________________________________________________________________    ___ I have read the above report and request that my patient be discharged from therapy.     Physician's Signature:_________________________   DATE:_________   TIME:________                           Reid Solano MD    ** Signature, Date and Time must be completed for valid certification **  Please sign and fax to  [phone].  Thank you       Patient Name:  Kit Maier :  1955

## 2025-07-24 NOTE — PROGRESS NOTES
IR WFL 5/5  WFL 80     ER WFL 4+/5  WFL 88   Elbow:   Strength AROM       Right Left Right Left     Flexion WFL 5/5 WFL WFL     Extension WFL 5/5 WFL 0   *All strength measures are on a scale with 5 as a maximum, if a space is left blank it was not tested.  All AROM measurements taken with patient in seated position.  All PROM measurements taken with patient in supine position.     Pain Level at end of session (0-10 scale): 0/10    Assessment   Patient has attended 31 visits post L shoulder bicep tendonesis. He has progressed very well and is independent with all tasks. He has good strength in the arm and functional ROM. He was given an updated HEP to allow more strengthening overall and discussed returning to normal daily tasks without modifications. He was encouraged to contact us with any questions or concerns. He is DC from PT services at this time.     PRECAUTIONS:  Phase 2, Weeks 6-12, 05/16/25-07/03/2025:  See protocol in chart-   per MD note 05/20/2025: Continue PT per protocol. No weights. No lifting of any kind. No strengthening exercises until at least 3 months (6/27/2025) postoperative      Sx date 04/04/2025        Date of Initial Evaluation: 4/7/2025  Date of last Progress Note: 07/07/2025  Visit # of last Progress Note: 26      Number of Insurance Authorized visits: Not Applicable     MD follow up PRN         Therapeutic Exercise Flow Sheet:                                                                                    Running Visit #    EXERCISE   21   22   23   24   25   26   27   28   29   30   31      UBE Lvl 3 x12 min Lvl 3 x12  Lvl 3 x12 Lvl 4 x 12 min Lvl 4 x 12 min Lvl 4 x 12 min Lvl 5 x 12 min Lvl 5 x 12 min Lvl 5 x 12 min Lvl 5 x 12 min Lvl 5 x 12 min      Wall Slide Flex And abd single arm x 20 ea And abd single arm x 20 ea And abd single arm x 20 ea And abd single arm x 20 ea And abd single arm x 20 ea            Scap Retrac YTB & Ext. X 20 Ea. YTB & Ext. X 20 Ea.   YTB &

## 2025-08-15 ENCOUNTER — CLINICAL DOCUMENTATION (OUTPATIENT)
Age: 70
End: 2025-08-15

## 2025-08-15 ENCOUNTER — TELEMEDICINE (OUTPATIENT)
Age: 70
End: 2025-08-15

## 2025-08-15 DIAGNOSIS — I10 PRIMARY HYPERTENSION: ICD-10-CM

## 2025-08-15 DIAGNOSIS — G47.33 OSA (OBSTRUCTIVE SLEEP APNEA): Primary | ICD-10-CM

## 2025-08-15 RX ORDER — FLUTICASONE PROPIONATE 50 MCG
SPRAY, SUSPENSION (ML) NASAL
COMMUNITY
Start: 2025-04-28

## 2025-08-15 ASSESSMENT — SLEEP AND FATIGUE QUESTIONNAIRES
HOW LIKELY ARE YOU TO NOD OFF OR FALL ASLEEP WHILE SITTING AND TALKING TO SOMEONE: WOULD NEVER DOZE
HOW LIKELY ARE YOU TO NOD OFF OR FALL ASLEEP WHILE LYING DOWN TO REST IN THE AFTERNOON WHEN CIRCUMSTANCES PERMIT: SLIGHT CHANCE OF DOZING
ESS TOTAL SCORE: 3
HOW LIKELY ARE YOU TO NOD OFF OR FALL ASLEEP WHILE WATCHING TV: SLIGHT CHANCE OF DOZING
HOW LIKELY ARE YOU TO NOD OFF OR FALL ASLEEP WHILE SITTING AND READING: SLIGHT CHANCE OF DOZING
HOW LIKELY ARE YOU TO NOD OFF OR FALL ASLEEP WHEN YOU ARE A PASSENGER IN A CAR FOR AN HOUR WITHOUT A BREAK: WOULD NEVER DOZE
HOW LIKELY ARE YOU TO NOD OFF OR FALL ASLEEP IN A CAR, WHILE STOPPED FOR A FEW MINUTES IN TRAFFIC: WOULD NEVER DOZE
HOW LIKELY ARE YOU TO NOD OFF OR FALL ASLEEP WHILE SITTING INACTIVE IN A PUBLIC PLACE: WOULD NEVER DOZE
HOW LIKELY ARE YOU TO NOD OFF OR FALL ASLEEP WHILE SITTING QUIETLY AFTER LUNCH WITHOUT ALCOHOL: WOULD NEVER DOZE